# Patient Record
Sex: FEMALE | Race: BLACK OR AFRICAN AMERICAN | Employment: UNEMPLOYED | URBAN - METROPOLITAN AREA
[De-identification: names, ages, dates, MRNs, and addresses within clinical notes are randomized per-mention and may not be internally consistent; named-entity substitution may affect disease eponyms.]

---

## 2021-10-28 ENCOUNTER — HOSPITAL ENCOUNTER (EMERGENCY)
Age: 61
Discharge: LEFT BEFORE DISCHARGE | End: 2021-10-29
Attending: EMERGENCY MEDICINE

## 2021-10-28 PROCEDURE — 75810000275 HC EMERGENCY DEPT VISIT NO LEVEL OF CARE

## 2021-10-28 NOTE — ED TRIAGE NOTES
Patient presents to triage, denies SI, but wants to be evaluated for her mental health. Informed her on the screening process of labs, covid swab and urine. She states she does not want to do that. And left from triage before allowing vital signs or any other hx.

## 2021-11-02 ENCOUNTER — HOSPITAL ENCOUNTER (INPATIENT)
Age: 61
LOS: 14 days | Discharge: HOME | DRG: 752 | End: 2021-11-16
Attending: EMERGENCY MEDICINE | Admitting: PSYCHIATRY & NEUROLOGY
Payer: COMMERCIAL

## 2021-11-02 DIAGNOSIS — F23 ACUTE PSYCHOSIS (HCC): Primary | ICD-10-CM

## 2021-11-02 DIAGNOSIS — F22 PARANOIA (HCC): ICD-10-CM

## 2021-11-02 LAB
ALBUMIN SERPL-MCNC: 4.1 G/DL (ref 3.4–5)
ALBUMIN/GLOB SERPL: 1 {RATIO} (ref 0.8–1.7)
ALP SERPL-CCNC: 75 U/L (ref 45–117)
ALT SERPL-CCNC: 18 U/L (ref 13–56)
AMPHET UR QL SCN: NEGATIVE
ANION GAP SERPL CALC-SCNC: 3 MMOL/L (ref 3–18)
AST SERPL-CCNC: 14 U/L (ref 10–38)
ATRIAL RATE: 101 BPM
BARBITURATES UR QL SCN: NEGATIVE
BASOPHILS # BLD: 0.1 K/UL (ref 0–0.1)
BASOPHILS NFR BLD: 1 % (ref 0–2)
BENZODIAZ UR QL: NEGATIVE
BILIRUB SERPL-MCNC: 0.4 MG/DL (ref 0.2–1)
BUN SERPL-MCNC: 14 MG/DL (ref 7–18)
BUN/CREAT SERPL: 15 (ref 12–20)
CALCIUM SERPL-MCNC: 9.5 MG/DL (ref 8.5–10.1)
CALCULATED P AXIS, ECG09: 75 DEGREES
CALCULATED R AXIS, ECG10: 41 DEGREES
CALCULATED T AXIS, ECG11: 65 DEGREES
CANNABINOIDS UR QL SCN: NEGATIVE
CHLORIDE SERPL-SCNC: 107 MMOL/L (ref 100–111)
CK MB CFR SERPL CALC: NORMAL % (ref 0–4)
CK MB SERPL-MCNC: <1 NG/ML (ref 5–25)
CK SERPL-CCNC: 75 U/L (ref 26–192)
CO2 SERPL-SCNC: 27 MMOL/L (ref 21–32)
COCAINE UR QL SCN: NEGATIVE
COVID-19 RAPID TEST, COVR: NOT DETECTED
CREAT SERPL-MCNC: 0.94 MG/DL (ref 0.6–1.3)
DIAGNOSIS, 93000: NORMAL
DIFFERENTIAL METHOD BLD: ABNORMAL
EOSINOPHIL # BLD: 0 K/UL (ref 0–0.4)
EOSINOPHIL NFR BLD: 1 % (ref 0–5)
ERYTHROCYTE [DISTWIDTH] IN BLOOD BY AUTOMATED COUNT: 12.3 % (ref 11.6–14.5)
ETHANOL SERPL-MCNC: <3 MG/DL (ref 0–3)
GLOBULIN SER CALC-MCNC: 4.3 G/DL (ref 2–4)
GLUCOSE SERPL-MCNC: 106 MG/DL (ref 74–99)
HCT VFR BLD AUTO: 44.1 % (ref 35–45)
HDSCOM,HDSCOM: NORMAL
HGB BLD-MCNC: 14.4 G/DL (ref 12–16)
LYMPHOCYTES # BLD: 2 K/UL (ref 0.9–3.6)
LYMPHOCYTES NFR BLD: 35 % (ref 21–52)
MAGNESIUM SERPL-MCNC: 2.3 MG/DL (ref 1.6–2.6)
MCH RBC QN AUTO: 28.6 PG (ref 24–34)
MCHC RBC AUTO-ENTMCNC: 32.7 G/DL (ref 31–37)
MCV RBC AUTO: 87.7 FL (ref 78–100)
METHADONE UR QL: NEGATIVE
MONOCYTES # BLD: 0.6 K/UL (ref 0.05–1.2)
MONOCYTES NFR BLD: 11 % (ref 3–10)
NEUTS SEG # BLD: 3 K/UL (ref 1.8–8)
NEUTS SEG NFR BLD: 53 % (ref 40–73)
OPIATES UR QL: NEGATIVE
P-R INTERVAL, ECG05: 144 MS
PCP UR QL: NEGATIVE
PLATELET # BLD AUTO: 311 K/UL (ref 135–420)
PMV BLD AUTO: 10.2 FL (ref 9.2–11.8)
POTASSIUM SERPL-SCNC: 4 MMOL/L (ref 3.5–5.5)
PROT SERPL-MCNC: 8.4 G/DL (ref 6.4–8.2)
Q-T INTERVAL, ECG07: 338 MS
QRS DURATION, ECG06: 72 MS
QTC CALCULATION (BEZET), ECG08: 438 MS
RBC # BLD AUTO: 5.03 M/UL (ref 4.2–5.3)
SODIUM SERPL-SCNC: 137 MMOL/L (ref 136–145)
SOURCE, COVRS: NORMAL
TROPONIN I SERPL-MCNC: <0.02 NG/ML (ref 0–0.04)
TSH SERPL DL<=0.05 MIU/L-ACNC: 0.69 UIU/ML (ref 0.36–3.74)
VENTRICULAR RATE, ECG03: 101 BPM
WBC # BLD AUTO: 5.7 K/UL (ref 4.6–13.2)

## 2021-11-02 PROCEDURE — 82553 CREATINE MB FRACTION: CPT

## 2021-11-02 PROCEDURE — 82550 ASSAY OF CK (CPK): CPT

## 2021-11-02 PROCEDURE — 84443 ASSAY THYROID STIM HORMONE: CPT

## 2021-11-02 PROCEDURE — 87635 SARS-COV-2 COVID-19 AMP PRB: CPT

## 2021-11-02 PROCEDURE — 99283 EMERGENCY DEPT VISIT LOW MDM: CPT

## 2021-11-02 PROCEDURE — 85025 COMPLETE CBC W/AUTO DIFF WBC: CPT

## 2021-11-02 PROCEDURE — 65220000001 HC RM PRIVATE PSYCH

## 2021-11-02 PROCEDURE — 80307 DRUG TEST PRSMV CHEM ANLYZR: CPT

## 2021-11-02 PROCEDURE — 80053 COMPREHEN METABOLIC PANEL: CPT

## 2021-11-02 PROCEDURE — 82077 ASSAY SPEC XCP UR&BREATH IA: CPT

## 2021-11-02 PROCEDURE — 83735 ASSAY OF MAGNESIUM: CPT

## 2021-11-02 PROCEDURE — 93005 ELECTROCARDIOGRAM TRACING: CPT

## 2021-11-02 PROCEDURE — 84484 ASSAY OF TROPONIN QUANT: CPT

## 2021-11-02 RX ORDER — ALPRAZOLAM 0.25 MG/1
0.5 TABLET ORAL
Status: DISPENSED | OUTPATIENT
Start: 2021-11-02 | End: 2021-11-03

## 2021-11-02 NOTE — BSMART NOTE
Comprehensive Assessment Integrated Summary Patient is a 63year old female who presented to the emergency room with c/o \"I feel paranoid. I feel like somebody is out to get me. I'm afraid to be alone. I can't sleep. My two dogs , and I really love my dogs. My son has been arrested for murder. My son is a good person, and we don't believe that he murdered somebody. \" Prior to crisis evaluation, patient appeared slightly frantic when asking sister several times \"please. ..come in here with me. \" Sister assured patient that she will wait in the lobby; emotional support offered, as well. Patient stated that she has been off medications, and she did not follow up with outpatient services, after being discharged from [facility], . \" Patient verbalized that she has diagnosed with paranoia in the year 2, and she has been admitted to several behavioral health facilities in the past. Patient denied thoughts of harm towards self or others. Patient denied hallucinations. Patient also denied use of illicit substances or alcoholic beverages. Patient added that she has a \"different brain. I can sense stuff, if I get one or two clues. I can figure out everything that's going on. \" 
 
Mental Status Exam 
 
The patient's appearance shows no evidence of impairment. The patient's behavior is guarded. The patient is oriented to time, place, person and situation. The patient's speech shows no evidence of impairment. The patient's mood is depressed. The range of affect is anxious, suspsicious. The patient's thought content demonstrates paranoia. The thought process shows no evidence of impairment. The patient's perception shows no evidence of impairment. The patient's memory shows no evidence of impairment. The patient's appetite shows no evidence of impairment. The patient's sleep has evidence of insomnia, \"up at night, I only sleep about one hour/night. \"  
 
DME: [employer] Access to weapons: Denied Housing: \"I live with my brother. \" 
: [employer] Legal Issues: Denied Education: \"1 year college\" Medications: \"None\" Substance Abuse: Denied Outpatient Care: \"None, since 2012\" Inpatient Services: \"[facility], [facility], SN\" Contact/Support Person: Chaitanya Gomez, daughter, [phone]\" Disposition Patient is receptive to voluntary admission at [facility]; discussed with on-call psychiatrist, admission orders received, and report called to charge nurse.  
 
Ben Boswell, RN, BSN

## 2021-11-02 NOTE — ED PROVIDER NOTES
EMERGENCY DEPARTMENT HISTORY AND PHYSICAL EXAM      Date: 11/2/2021  Patient Name: Debbie Stewart    History of Presenting Illness     Chief Complaint   Patient presents with   [address] Problem       History Provided By: Patient    HPI: Debbie Stewart, 63 y.o. female PMHx significant for htn, anxiety presents ambulatory to the ED. Patient reports feelings of paranoia with increased thoughts of people coming after her or coming to find her. Pt reports increased difficulty sleeping. Patient reports history of anxiety and depression. Patient reports she has previously been medicated with multiple medications but she is not taking any currently. Patient reports Zoloft and celexa have worked best for her in the past.  Patient also reports increased stressors including unemployment, death of her dogs, and trouble with her son. Pt is not currently taking any medication. Denies CP, SOB, dizziness, lower leg swelling. Denies SI and HI. Multiple times during visit pt asked \"are you all going to hurt me?\". There are no other complaints, changes, or physical findings at this time. PCP: None    No current facility-administered medications on file prior to encounter. Current Outpatient Medications on File Prior to Encounter   Medication Sig Dispense Refill    multivitamin (ONE A DAY) tablet Take 1 Tab by mouth daily. Past History     Past Medical History:  Past Medical History:   Diagnosis Date    Anxiety     Hypertension        Past Surgical History:  Past Surgical History:   Procedure Laterality Date    HX BREAST BIOPSY Left 1990s    for a cyst pt says it was not cancer    HX ORTHOPAEDIC Right 1999    R elbow       Family History:  No family history on file.     Social History:  Social History     Tobacco Use    Smoking status: Never Smoker    Smokeless tobacco: Never Used   Substance Use Topics    Alcohol use: Not Currently    Drug use: Not Currently       Allergies:  No Known Allergies      Review of Systems   Review of Systems   Constitutional: Negative for chills and fever. Respiratory: Negative for shortness of breath. Cardiovascular: Negative for chest pain. Gastrointestinal: Negative for abdominal pain, nausea and vomiting. Genitourinary: Negative for flank pain. Musculoskeletal: Negative for back pain and myalgias. Skin: Negative for color change, pallor, rash and wound. Neurological: Negative for dizziness, weakness and light-headedness. Psychiatric/Behavioral: Positive for hallucinations and sleep disturbance. Negative for suicidal ideas. All other systems reviewed and are negative. Physical Exam   Physical Exam  Vitals and nursing note reviewed. Constitutional:       General: She is not in acute distress. Appearance: She is well-developed. Comments: Pt in NAD   HENT:      Head: Normocephalic and atraumatic. Eyes:      Conjunctiva/sclera: Conjunctivae normal.   Cardiovascular:      Rate and Rhythm: Regular rhythm. Tachycardia present. Heart sounds: Normal heart sounds. Comments: No lower leg swelling b/l  Pulmonary:      Effort: Pulmonary effort is normal. No respiratory distress. Breath sounds: Normal breath sounds. Comments: Lungs CTA  Not working to breathe  Abdominal:      General: Bowel sounds are normal. There is no distension. Palpations: Abdomen is soft. Musculoskeletal:         General: Normal range of motion. Skin:     General: Skin is warm. Findings: No rash. Neurological:      Mental Status: She is alert and oriented to person, place, and time. Psychiatric:         Mood and Affect: Mood is anxious. Thought Content: Thought content does not include homicidal or suicidal ideation. Diagnostic Study Results     Labs -   No results found for this or any previous visit (from the past 12 hour(s)).     Radiologic Studies -   No orders to display     CT Results  (Last 48 hours) None        CXR Results  (Last 48 hours)    None          Medical Decision Making   I am the first provider for this patient. I reviewed the vital signs, available nursing notes, past medical history, past surgical history, family history and social history. Vital Signs-Reviewed the patient's vital signs. Patient Vitals for the past 12 hrs:   Temp Pulse Resp BP SpO2   11/02/21 1425 97.8 °F (36.6 °C) (!) 134 20 (!) 178/77 100 %         EKG interpretation: (Preliminary)  Rhythm: sinus tachycardia; and regular . Rate (approx.): 101; Axis: normal; WY interval: normal; QRS interval: normal ; ST/T wave: normal; Other findings: normal.    No acute ischemic changes. Records Reviewed: Nursing Notes, Old Medical Records, Previous Radiology Studies and Previous Laboratory Studies    Provider Notes (Medical Decision Making):   DDx: Paranoia, Anxiety, Depression, Hyperthyroidism     64 yo F who presents with increasing paranoia. Hx anxiety and depression. On exam pt anxious and tachycardic. Asymptomatic. Denies palpitations, CP, SOB, dizziness. EKG shows no acute ischemic changes. All other labs essentially unremarkable. ED Course:   Initial assessment performed. The patients presenting problems have been discussed, and they are in agreement with the care plan formulated and outlined with them. I have encouraged them to ask questions as they arise throughout their visit. 1500: Spoke with crisis regarding pt's presentation. 1556: Pt medically cleared. Informed crisis. 1809: Transfer of care to Catrachita JOSEPH at shift change. Plan: Awaiting crisis evaluation. Attestations:    REJI Velasquez    Please note that this dictation was completed with Net[employer], the [employer] voice recognition software. Quite often unanticipated grammatical, syntax, homophones, and other interpretive errors are inadvertently transcribed by the computer software. Please disregard these errors.   Please excuse any errors that have escaped final proofreading. Thank you.

## 2021-11-02 NOTE — ED TRIAGE NOTES
Patient reports anxiety, depression and paranoia for months to years. Denies SI or hx of suicide attempt. Denies visual or auditory hallucinations. Reports decreased sleep because she feels like someone is out to get her.

## 2021-11-03 PROBLEM — F22 DELUSIONAL DISORDER (HCC): Status: ACTIVE | Noted: 2021-11-03

## 2021-11-03 PROCEDURE — 65220000003 HC RM SEMIPRIVATE PSYCH

## 2021-11-03 PROCEDURE — 99222 1ST HOSP IP/OBS MODERATE 55: CPT | Performed by: PSYCHIATRY & NEUROLOGY

## 2021-11-03 PROCEDURE — 74011250637 HC RX REV CODE- 250/637: Performed by: PSYCHIATRY & NEUROLOGY

## 2021-11-03 RX ORDER — TRAZODONE HYDROCHLORIDE 50 MG/1
50 TABLET ORAL
Status: DISCONTINUED | OUTPATIENT
Start: 2021-11-03 | End: 2021-11-16 | Stop reason: HOSPADM

## 2021-11-03 RX ORDER — IBUPROFEN 400 MG/1
400 TABLET ORAL
Status: DISCONTINUED | OUTPATIENT
Start: 2021-11-03 | End: 2021-11-16 | Stop reason: HOSPADM

## 2021-11-03 RX ORDER — HYDROXYZINE PAMOATE 50 MG/1
50 CAPSULE ORAL
Status: DISCONTINUED | OUTPATIENT
Start: 2021-11-03 | End: 2021-11-16 | Stop reason: HOSPADM

## 2021-11-03 RX ORDER — AMLODIPINE BESYLATE 5 MG/1
2.5 TABLET ORAL DAILY
Status: DISCONTINUED | OUTPATIENT
Start: 2021-11-03 | End: 2021-11-16 | Stop reason: HOSPADM

## 2021-11-03 RX ADMIN — TRAZODONE HYDROCHLORIDE 50 MG: 50 TABLET ORAL at 20:18

## 2021-11-03 NOTE — H&P
[address] HISTORY AND PHYSICAL    Name:  Brittany Mary  MR#:   [id number]  :  1960  ACCOUNT #:  [de-identified]  ADMIT DATE:  2021    IDENTIFYING INFORMATION:  The patient is a 65-year-old female, admitted to this facility on a voluntary basis on the above-mentioned date. BASIS FOR ADMISSION:  The patient presented herself to [facility] Room complaining of being anxious and specifically described the presence of increased paranoia. The patient described her paranoid thoughts are believing that people were coming after her or coming to find her. The intent of these people she said was to harm her. This resulted on the patient also describing increased difficulty sleeping, feeling anxious, and also apparently feeling depressed. It is to be mentioned that when the undersigned met with the patient this morning, she basically only mentioned the presence of paranoia, however did not want to discuss any potential reasons for which the paranoia started after a specific trigger. She only mentioned that something had happened to her that she did not want to talk about, this resulting on now people wanting to harm her. Regardless, after she was medically cleared, the case was presented to the physician on-call who admitted the patient to my service. PAST PSYCHIATRIC HISTORY:  During this initial session, the patient described again increased paranoia which has lasted for the last several years. She did not want to elaborate upon what was the incident that happened with \"someone\" but described that there was something that the incident had harmed her. During the evaluation, she indicated that one of the reasons she does not want to talk about it is because she feels very bad every time that she has talked and/or brought up what had happened to her.   The chart indicated when she came to the emergency room that the patient has had several losses including apparently the loss of her dogs and also having trouble with her son. The fact that she is currently unemployed is also another stressor; however, this is mentioned in the admission to the emergency room but not in the nurses admission note. However, the patient did not want to discuss them either. It is not clear as to who has prescribed the patient in the past.  The chart indicated that she has been prescribed with sertraline and citalopram with the latter being the one that she has been most helped by; however, the patient was very clear today when I met with her that she believes that \"God will be able to provide for me and I don't know that we will need any medications. \"  She did leave the opportunity of them to be able to decide tomorrow after we discussed medications this morning regarding the possibility of her being prescribed with something to help her with her anxiety and also to improve her depression. MEDICAL HISTORY:  The patient has had a history of hypertension. She described a history of anxiety which is the reason for which she is unemployed. 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  However, the patient became unable to do so and that increased her anxiety to the point in which she was basically fired from her job. A note from her doctor did not help either. The surgical history is remarkable for having a breast biopsy, apparently was a benign finding. It was a left-sided breast biopsy by the way. She has a history of right elbow surgery in 1999, the specifics are not clear. ALLERGIES:  The patient has a negative history of medications and/or food-related allergies. REVIEW OF SYSTEMS:  The positive review is only from the psychiatric system which is remarkable for the presence of paranoia, questionable hallucinations, and also sleep disturbance.   The patient denied however being suicidal or homicidal.  She is rather afraid. She says that someone else will harm her and apparently asked the undersigned, the same as different examiner since she was at the emergency room if they were to hurt her or not. Physical exam at the emergency department was performed which was that of a patient with blood pressure 178/77, pulse 134, temperature 97.8, respirations 20, 100% oxygen saturation rate on room air. The patient was described as being in no acute physical distress. Her cardiovascular examination was remarkable for the patient's history of tachycardia, however otherwise negative. Pulmonary was clear. On neurological examination, she was described as oriented to person, place, and time. Nothing else was added to that. Psychiatric examination was that of an anxious patient who denies being suicidal or homicidal; however, was found to be rather delusional as stated. Multiple labs have been performed since the patient was admitted to the emergency department including a CBC with differential that showed completely normal test results. CMP showed a sodium of 137, potassium 4.0, chloride 107, blood sugar 106, BUN 14, creatinine 0.94, estimated GFR above 60 mL/min. Liver function tests normal.  Cardiac enzymes negative. Alcohol level was below 3. Urine drug screen was negative. The patient had a COVID rapid testing which showed which showed negative results from a nasopharyngeal sample. An electrocardiogram done at the emergency room showed a sinus tachy with a ventricular rate response of 101 beats per minute, , QTc 438. ALCOHOL AND DRUG HISTORY:  The patient denies the use of alcohol, illicit drugs, abusing over-the-counter medications or prescription medications. PERSONAL AND FAMILY HISTORY:  The patient indicated that her mother is alive and around 80years of age. Father is . She believes that her mother is also in trouble because of the patient herself. She described that she has a brother and a nephew who have had psychiatric problems as they \"hear voices. \"  The patient has two children; a son and daughter with her son having one daughter and her daughter having three of her own. The patient is currently unemployed. She attempted to obtain social security disability; however, she was rejected. She last worked on or about 2018. MENTAL STATUS EXAMINATION: This is a female who looks her stated age. During this evaluation, there is no evidence of alcohol or any other type of drug-related signs of intoxication or withdrawal symptoms. She is currently coherent, showing quality of continuity of associations without evidence of flight of ideas and/or pressure of speech. She is rather suspicious and hypervigilant. She denied to the undersigned any hallucinations and also ideas of reference or influence; however, she is obviously delusional.  The patient described herself as anxious; however, she denied to me being depressed. She denies suicidal and/or homicidal thoughts, plans, and/or intentions. She is mostly afraid that she is going to be hurt. During the session, she asked me several times if I was going to hurt her. I discussed the case with our treatment team prior to my seeing her and the nurse that evaluated her this morning also indicated that the patient was complaining to her and was asking her if she was going to harm her or not. CLINICAL IMPRESSION:  AXIS I:  Delusional disorder. AXIS II:  Noncontributory. AXIS III:  Hypertension by history. History of left breast biopsy with negative results. Remote history of right elbow surgery in 1999, specifics not known. TREATMENT PLAN:  1.   The patient was admitted to the adult program.  She will be seen on daily individual psychiatric basis and will be referred to the groups within the context of the program.  2.  The patient will have assigned an inpatient  that was going to help us out obtaining further information about the patient herself. 3.  During the evaluation, the patient and the undersigned discussed the possibility of treatment with \"medications;\" however, she indicated that she would prefer not to be prescribed anything at all. She indicated that she is wanting to wait until tomorrow to decide if she wants to take medications or not. During the evaluation, the patient described that she believes in God and that is why she hopes He is going to cure her. 4.  So it is possible that if she refuses medications that the impact of her admission will be very limited. Again, I am hopeful that she will be able to agree to medications tomorrow. ESTIMATE LENGTH OF STAY/PROGNOSIS:  ELOS is five days with outpatient treatment referral then. Prognosis will entirely depend upon treatment response and treatment compliance. Alejo Odell MD, LFAPA      [id number]  D:  11/03/2021 10:15  T:  11/03/2021 16:40  JOB #:  [id number]    Behavioral Services  Medicare Certification Upon Admission    I certify that this patient's inpatient psychiatric hospital admission is medically necessary for:      [x] Treatment which could reasonably be expected to improve this patient's condition,       [] For diagnostic study;     AND     [x] The inpatient psychiatric services are provided while the individual is under the care of a physician and are included in the individualized plan of care. Estimated length of stay/service is 5 to 7 days. Plan for post-hospital care: The patient will require medical and psychiatric outpatient follow-up. The former with a PCP of her choice, the latter with a local community services Board.     Electronically signed by [unfilled] on 11/5/2021 at 2:50 PM

## 2021-11-03 NOTE — PROGRESS NOTES
Problem: Psychosis  Goal: *STG: Remains safe in hospital  Description: Pt will remain safe in hospital daily during this admission. Outcome: Progressing Towards Goal   Pt. is visible in the milieu but keeps to herself. She is anxious, paranoid but refusing to take any medications and eat the hospital food. She told this writer that she does not feel safe here, frequent reassurance and redirection done. She denies SI,HI or A/V/H. Will continue to monitor for safety and provide support as needed.

## 2021-11-03 NOTE — BH NOTES
The documentation for this period is being entered following the guidelines as defined in the [facility] downtime policy by Kyung Saucedo (CURT). Pt downtime sheets are in patient chart.

## 2021-11-03 NOTE — ROUTINE PROCESS
Pt presented  to ER with c/o anxiety, paranoia and depression. She reports that her paranoia  has  worsen and believe  that people are out to get her. She doesn't want to be around people she doesn't know. Pt tells me that she doesn't want to be here either because we're all strangers. Pt reassured  and encouraged to voice her needs as needed and staff will be available to support and assist her needs. Pt is currently living with her brother due to her being jobless, and no income. Pt denies alcohol or drug use, she denies legal issues and she is self admit. She currently sleeps 1 to 3 hrs nightly due anxiety. Her  BP is  eleveted and currently not on BP medication probably due to insurance issue, she only takes vitamins at home. Pt refused to signed admission papers. Pt  was oriented to unit and expectations related to treatment, unit tour done. Staff continues to monitor for safety and provide a supportive environment.

## 2021-11-03 NOTE — BSMART NOTE
ART THERAPY GROUP PROGRESS NOTE PATIENT SCHEDULED FOR GROUP AT: 14:15 
 
ATTENDANCE: Full PARTICIPATION LEVEL:Participates fully in the art process ATTENTION LEVEL: Able to focus on task FOCUS: Mindfulness SYMBOLIC & THEMATIC CONTENT AS NOTED IN IMAGERY: She was calm, compliant, and invested in the task at hand. She chose the quote \"everything is on the other side of fear\" and spoke about the difference between using fear as a means to cripple one's progress or using it to be aware and prepared to promote one's safety.

## 2021-11-03 NOTE — PROGRESS NOTES
Patient states she was doing \" ok\". States \" I don't trust people, or the food you give me or the medications, I'm not suicidal\". 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

## 2021-11-03 NOTE — ED NOTES
Report called to [facility] on crisis floor.    Vanna Kan, crisis supervisor requesting we wait 15 minutes to send pt up

## 2021-11-03 NOTE — BSMART NOTE
History:  Arnav Vázquez, 63 y.o. female PMHx significant for htn, anxiety presents ambulatory to the ED. Patient reports feelings of paranoia with increased thoughts of people coming after her or coming to find her. Pt reports increased difficulty sleeping. Patient reports history of anxiety and depression    Assessment/Intervention: SW made contact with patient as she engaged with peers in the milieu. Patient denies SI/HI. Patient denies AVH. Patient however, endorses paranoia. Patient reports she believes she is afraid someone is out to kill her. She reports the need for safety and stated the person may be in this hospital.  SW addressed patients safety. SW addresses staff's duties and ensured her safety. Patient then began to address her relationship with God. Patient reports the need for safety and wishes she could feel safe. Patient reports she is afraid to use prescribed medications and is unsure if she will comply. Patient reports she is interested in Outpatient Therapy. Patient is encouraged to continue with treatment goals. SW addressed self esteem, self efficacy and empowerment. SW will continue to monitor patient and will assist with discharge as needed.     Amina Tripathi, JUICE-E

## 2021-11-03 NOTE — H&P
Psychiatry History and Physical    Subjective:     Date of Evaluation:  11/3/2021    Reason for Referral:  Arnav Vázquez was referred to the examiners from  ED for paranoia. History of Presenting Problem: Arnav Vázquez is a 64 yo F with PMH Anxiety, HTN who was admitted for paranoia. She denies SI and HI. Tox screen, EtOH and Rapid COVID all negative. She denies any physical complaints today. Patient Active Problem List    Diagnosis Date Noted    Delusional disorder ([address] 11/03/2021     Past Medical History:   Diagnosis Date    Anxiety     Hypertension      Past Surgical History:   Procedure Laterality Date    HX BREAST BIOPSY Left 1990s    for a cyst pt says it was not cancer    HX ORTHOPAEDIC Right 1999    R elbow       No family history on file. Social History     Tobacco Use    Smoking status: Never Smoker    Smokeless tobacco: Never Used   Substance Use Topics    Alcohol use: Not Currently     Prior to Admission medications    Medication Sig Start Date End Date Taking? Authorizing Provider   multivitamin (ONE A DAY) tablet Take 1 Tab by mouth daily.    Yes Other, MD Ange     No Known Allergies     Review of Systems - History obtained from chart review and the patient  General ROS: negative  Psychological ROS: positive for - hallucinations  Ophthalmic ROS: negative  ENT ROS: negative  Respiratory ROS: negative  Cardiovascular ROS: negative  Gastrointestinal ROS: negative  Musculoskeletal ROS: negative  Neurological ROS: negative  Dermatological ROS: negative      Objective:     Patient Vitals for the past 8 hrs:   BP Temp Pulse Resp SpO2   11/03/21 0827 118/69 (!) 96.4 °F (35.8 °C) (!) 101 16 100 %       Mental Status exam: WNL except for    Sensorium  oriented to time, place and person   Orientation situation   Relations cooperative   Eye Contact appropriate   Appearance:  age appropriate   Motor Behavior:  within normal limits   Speech:  normal pitch and normal volume Vocabulary average   Thought Process: goal directed   Thought Content delusions   Suicidal ideations no plan  and no intention   Homicidal ideations no plan  and no intention   Mood:  stable   Affect:  stable   Memory recent  adequate   Memory remote:  adequate   Concentration:  adequate   Abstraction:  concrete   Insight:  fair   Reliability fair   Judgment:  fair         Physical Exam:   Visit Vitals  /69   Pulse (!) 101   Temp (!) 96.4 °F (35.8 °C)   Resp 16   SpO2 100%   Breastfeeding No     General appearance: alert, cooperative, no distress, appears stated age  Head: Normocephalic, without obvious abnormality, atraumatic  Eyes: negative  Ears: normal TM's and external ear canals AU  Nose: Nares normal. Septum midline. Mucosa normal. No drainage or sinus tenderness. Throat: Lips, mucosa, and tongue normal. Teeth and gums normal  Neck: supple, symmetrical, trachea midline and no adenopathy  Lungs: clear to auscultation bilaterally  Heart: regular rate and rhythm, S1, S2 normal, no murmur, click, rub or gallop  Abdomen: soft, non-tender. Extremities: extremities normal, atraumatic, no cyanosis or edema  Skin: Skin color, texture, turgor normal. No rashes or lesions  Neurologic: Grossly normal        Impression:      Principal Problem:    Delusional disorder ([address] (11/3/2021)          Plan:     Recommendations for Treatment/Conditions:  Psychiatric treatment recommended while in hospital  Admit to inpatient psych for paranoia     Referral To:    Inpatient psychiatric care        [place], [place]   11/3/2021 3:29 PM

## 2021-11-04 PROCEDURE — 65220000003 HC RM SEMIPRIVATE PSYCH

## 2021-11-04 PROCEDURE — 99231 SBSQ HOSP IP/OBS SF/LOW 25: CPT | Performed by: PSYCHIATRY & NEUROLOGY

## 2021-11-04 NOTE — BSMART NOTE
BH Biopsychosocial Assessment    Current Level of Psychosocial Functioning     [x]Independent  []Dependent  []Minimal Assist      Comments:      Psychosocial High Risk Factors (check all that apply)      [x]Unable to obtain meds                                                               [x]Medical / illness/pain   High BP  []Substance abuse   []Lack of Family Support   []Financial stress   [x]Isolation   []Inadequate Community Resources  []Suicide attempt(s)  [x]Not taking medications   []Victim of crime   []Developmental Delay  []Unable to manage personal needs    []Age 72 or older   []  Homeless  []Garret transportation   []Readmission within 30 days  [x]Unemployment  []Traumatic Event    Psychiatric Advanced Directive:  n/a    Family to involve in treatment: lives with brother, contact Daughter Bertis Goodpasture # #[phone]    Sexual Orientation:  Not discussed    Patient Strengths: asking for help, supportive family    Patient Barriers: hx of non compliance with meds & outpt tx , paranoid,  dysphoric, anxious, insomnia, unemployed    CD education provided: in groups & IT, denies CD hx    Safety plan: will contract for safety with nursing staff & tx team    CMHC/MH history: couple of in hospitals (last ), then never followed up with outpt tx ( last  )    Plan of Care:  medication management, group/individual therapies, family meetings, psycho -education, treatment team meetings to assist with stabilization    Initial Discharge Plan:  [place] [facility]    Clinical Summary:     Pt is a 63year old female who presented to the emergency room with c/o \"I feel paranoid. I feel like somebody is out to get me. I'm afraid to be alone. I can't sleep. My two dogs , and I really love my dogs. My son has been arrested for murder. My son is a good person, and we don't believe that he murdered somebody. \" pt remains guarded. No meds since  ??   No outpt tx also since then. Lives with son. Dghter listed above also supportive. She's unemployed since 2018,  after being fired from [employer]. CLINICAL IMPRESSION:  AXIS I:  Delusional disorder. AXIS II:  Noncontributory. AXIS III:  Hypertension by history. History of left breast biopsy with negative results. Remote history of right elbow surgery in 1999, specifics not known     Intervention: although pt suspicious of writer,did explain my role as well as how [facility] adult unit structured. although release signed to speak to her daughter, pt was resistant to do so at this point.  & tx team updated.

## 2021-11-04 NOTE — PROGRESS NOTES
Problem: Psychosis  Goal: *STG: Decreased delusional thinking  Description: Pt will experience a decrease in delusional daily during this admission. Outcome: Progressing Towards Goal  Goal: *STG: Remains safe in hospital  Description: Pt will remain safe in hospital daily during this admission. Outcome: Progressing Towards Goal  Goal: *STG: Participates in individual and group therapy  Description: Pt will participates in individual and group therapy daily while hospitalized. Outcome: Progressing Towards Goal  Goal: *STG/LTG: Complies with medication therapy  Description: Pt will be medication compliant daily. Outcome: Progressing Towards Goal   Patient is paranoid, guarded. Refused to have vital signs taken. Did not take her morning medications. She usually sits alone. Will attend select groups.

## 2021-11-04 NOTE — GROUP NOTE
[facility] GROUP DOCUMENTATION INDIVIDUAL Group Therapy Note Date: 11/3/2021 Group Start Time: 2000 Group End Time: 2030 Group Topic: Medication [facility] 1 ADULT CHEM DEP Cheyenne Medrano RN 
 
[facility] GROUP DOCUMENTATION GROUP Group Therapy Note Attendees: 6 Attendance: Did not attend Additional Notes:  Pt. decline to join the group.  
 
Dae Schwarz RN

## 2021-11-04 NOTE — BH NOTES
SIMONA Note:-S- \"These people is poisoning me\"-\"I don't trust them I am not eating this open food\". \"-- The above pt appeared to be very paranoid and has refused to eat her lunch. She was educated by the staff (person writing) the importance of safety while on the unit. She appeared to be receptive of this information. She verbalized \"I only eat seafood and vegetables that how I lost all this weight\". The above pt was encouraged to consume some of her food she only drunk her milk and refused to eat her meal. She was informed on the sealed food items that her family can bring (per policy) she then proceeded to verbalize \"I want my daughter to bring me food up to the door and hand it to me that way I know it is safe\". She again was educated on the visiting  Policy and also the a nutrients will be able to come and see her in regards to her food needs. She appeared receptive of this information. Staff informed the nurse and also called dietary to get the above pt some sealed fruit cups during this shift.

## 2021-11-04 NOTE — PROGRESS NOTES
Behavioral Health Progress Note    Admit Date: 11/2/2021  Hospital day 2    Vitals : Patient Vitals for the past 8 hrs:   Height Weight   11/04/21 1452 5' 5\" (1.651 m) 53.5 kg (118 lb)     Labs:  No results found for this or any previous visit (from the past 24 hour(s)). Meds:   Current Facility-Administered Medications   Medication Dose Route Frequency    hydrOXYzine pamoate (VISTARIL) capsule 50 mg  50 mg Oral TID PRN    traZODone (DESYREL) tablet 50 mg  50 mg Oral QHS PRN    ibuprofen (MOTRIN) tablet 400 mg  400 mg Oral Q6H PRN    amLODIPine (NORVASC) tablet 2.5 mg  2.5 mg Oral DAILY      Hospital Problems: Principal Problem:    Delusional disorder ([address] (11/3/2021)        Subjective:   Medication side effects: NA  NA    Mental Status Exam  Sensorium: alert  Orientation: only aware of  time, place and person  Relations: guarded and passive  Eye Contact: poor  Appearance: is bizarre  Thought Process: normal rate of thoughts and poor abstract reasoning/computation   Thought Content: delusions and paranoia   Suicidal: denies, level1   Homicidal: none   Mood: is anxious   Affect: stable  Memory: shows no evidence of impairment     Concentration: distractable  Abstraction: concrete  Insight: The patient shows little insight    OR Poor  Judgement: is psychologically impaired OR  Poor    Assessment/Plan:   not changed    Continue close observation      Patient is seen today in coverage for Dr. Gerry Parmar who is ill. She has not been placed on any medications. she did receive trazodone last night and says that caused her left thigh to feel numb so that is proof that she should not be using medications. She continues to insist that she does not need any.   She is quite paranoid and concerned that there is someone out there that is her enemy who is paying someone else to poison her food so that she could not eat food here in the hospital.  The dietitian did work with her to try to get her some foods that are sealed and she did drink Ensure. She continues to say she will not eat any foods that are not sealed. She says she is just going to wait to see whether or not she needs treatment of any kind. She denies homicidal or suicidal ideas. She does refuse vital signs and refuses to take blood pressure medicines also. It is impossible to know if she needs blood pressure medicine since she will not allow her vital signs to be taken.

## 2021-11-04 NOTE — CONSULTS
Comprehensive Nutrition Assessment    Type and Reason for Visit: Initial, Consult    Nutrition Recommendations/Plan:   - Modify diet to provide pre-packaged foods, lacto-ovo pescatarian.  - Add supplements: Ensure Enlive, TID.  - Monitor and encourage po intake as tolerated. - Updated pt's wt today. Nutrition Assessment:  Pt paranoid, refusing to take medications or eat hospital food. States she would feel better with pre-packaged foods and ate a fruit cup that was sealed today. Pt agreeable to Ensure supplements and standing scale wt obtained today. Discussed with kitchen staff. Malnutrition Assessment:  Malnutrition Status: At risk for malnutrition (specify) (paranoia, only eating pre-packaged meals)      Nutrition History and Allergies: PMHx- HTN and anxiety. Presented to ED c/o being anxious and specifically describes the presence of increased paranoia. Pt unfamiliar with weight trends- 121# in November 2019 per chart and standing scale wt of 118# (11/4/22). Variable appetite and po intake. States she has been a vegetarian, eats fish, eggs and dairy. NKFA. Estimated Daily Nutrient Needs:  Energy (kcal): 8050-6046; Weight Used for Energy Requirements: Current (54 kg)  Protein (g): 43-65; Weight Used for Protein Requirements: Current (0.8-1.2)  Fluid (ml/day): 1681-1275; Method Used for Fluid Requirements: 1 ml/kcal      Nutrition Related Findings:  None      Wounds:    None       Current Nutrition Therapies:  ADULT DIET Regular    Anthropometric Measures:  · Height:  5' 5\" (165.1 cm)  · Current Body Wt:  53.5 kg (118 lb)   · Admission Body Wt:  118 lb    · Usual Body Wt:  54.9 kg (121 lb) (November 2019 per chart)     · Ideal Body Wt:  125 lbs:  94.4 %     · BMI Category:  Normal weight (BMI 18.5-24. 9)       Nutrition Diagnosis:   · Inadequate oral intake related to psychological cause or life stress (paranoia) as evidenced by intake 0-25%    Nutrition Interventions:   Food and/or Nutrient Delivery: Modify current diet, Start oral nutrition supplement  Nutrition Education and Counseling: No recommendations at this time, Education not indicated  Coordination of Nutrition Care: Continue to monitor while inpatient (pt discussed with nursing)    Goals:  PO nutrition intake will meet >75% of patient estimated nutritional needs within the next 7 days.        Nutrition Monitoring and Evaluation:   Behavioral-Environmental Outcomes: Beliefs and attitudes  Food/Nutrient Intake Outcomes: Diet advancement/tolerance, Food and nutrient intake, Supplement intake  Physical Signs/Symptoms Outcomes: Meal time behavior, Nutrition focused physical findings    Discharge Planning:    Continue current diet, Continue oral nutrition supplement     Electronically signed by Amaris Cool RD on 11/4/2021 at 3:05 PM    Contact: [phone]

## 2021-11-04 NOTE — BH NOTES
SBAR report received from [place]. Behavioral health rounds completed. Pt does not appear to be in distress. Pt encouraged to seek staff for questions and concerns.

## 2021-11-04 NOTE — BSMART NOTE
SOCIAL WORK GROUP THERAPY PROGRESS NOTE Group Time:  10:15am 
 
Group Topic:  Coping Skills Group Participation: Pt was unavailable for group as she continues to be suspicious and meeting with nursing staff about her concerns. INFECTIOUS DISEASES FOLLOW UP--Darnell Davis MD  Pager [phone]    This is a follow up note for this  54y Female with  abd inflammation/infection--likely with mesh involvement.  plans noted for surgical exploration tomorrow.  feels better compared to admission---less pain.    Further ROS:  CONSTITUTIONAL:  No fever, good appetite  CARDIOVASCULAR:  No chest pain or palpitations  RESPIRATORY:  No dyspnea  GASTROINTESTINAL:  No nausea, vomiting, diarrhea, or abdominal pain  GENITOURINARY:  No dysuria  NEUROLOGIC:  No headache,     Allergies  No Known Allergies    ANTIBIOTICS/RELEVANT:  antimicrobials  piperacillin/tazobactam IVPB. 3.375 Gram(s) IV Intermittent every 8 hours    OTHER:  acetaminophen   Tablet. 650 milliGRAM(s) Oral every 6 hours PRN  acetaminophen  IVPB. 1000 milliGRAM(s) IV Intermittent once  acetaminophen  IVPB. 1000 milliGRAM(s) IV Intermittent once  aspirin  chewable 81 milliGRAM(s) Oral daily  atorvastatin 80 milliGRAM(s) Oral at bedtime  carvedilol 6.25 milliGRAM(s) Oral every 12 hours  dextrose 5%. 1000 milliLiter(s) IV Continuous <Continuous>  dextrose 50% Injectable 12.5 Gram(s) IV Push once  dextrose 50% Injectable 25 Gram(s) IV Push once  dextrose 50% Injectable 25 Gram(s) IV Push once  dextrose Gel 1 Dose(s) Oral once PRN  diphenhydrAMINE   Capsule 50 milliGRAM(s) Oral every 6 hours PRN  docusate sodium 100 milliGRAM(s) Oral three times a day  glucagon  Injectable 1 milliGRAM(s) IntraMuscular once PRN  heparin  Injectable 5000 Unit(s) SubCutaneous every 8 hours  insulin glargine Injectable (LANTUS) 10 Unit(s) SubCutaneous at bedtime  insulin lispro (HumaLOG) corrective regimen sliding scale   SubCutaneous three times a day before meals  insulin lispro (HumaLOG) corrective regimen sliding scale   SubCutaneous at bedtime  insulin lispro Injectable (HumaLOG) 4 Unit(s) SubCutaneous three times a day before meals  lactated ringers. 1000 milliLiter(s) IV Continuous <Continuous>  lisinopril 10 milliGRAM(s) Oral daily  oxyCODONE    IR 5 milliGRAM(s) Oral every 4 hours PRN  oxyCODONE    IR 10 milliGRAM(s) Oral every 4 hours PRN  senna 2 Tablet(s) Oral at bedtime  sodium chloride 0.9% lock flush 3 milliLiter(s) IV Push every 8 hours    Objective:  Vital Signs Last 24 Hrs  T(C): 36.5 (02 Nov 2017 09:12), Max: 37 (02 Nov 2017 05:22)  T(F): 97.7 (02 Nov 2017 09:12), Max: 98.6 (02 Nov 2017 05:22)  HR: 69 (02 Nov 2017 09:12) (69 - 89)  BP: 134/78 (02 Nov 2017 09:12) (127/57 - 158/73)  BP(mean): --  RR: 17 (02 Nov 2017 09:12) (16 - 18)  SpO2: 97% (02 Nov 2017 09:12) (96% - 99%)    PHYSICAL EXAM:  Constitutional:no acute distress  Eyes:KENIA, EOMI  Ear/Nose/Throat: no oral lesions, 	  Respiratory: clear BL  Cardiovascular: S1S2  Gastrointestinal:soft, (+) BS, no tenderness  Extremities:no e/e/c  No Lymphadenopathy  IV sites not inflammed.    LABS:                        9.8    9.6   )-----------( 169      ( 02 Nov 2017 07:19 )             30.2     11-02    134<L>  |  96  |  19  ----------------------------<  303<H>  4.3   |  24  |  1.22    Ca    9.2      02 Nov 2017 07:18  Phos  3.1     11-02  Mg     1.6     11-02    TPro  7.2  /  Alb  3.8  /  TBili  0.4  /  DBili  0.1  /  AST  18  /  ALT  25  /  AlkPhos  79  11-02    MICROBIOLOGY: NA    Imp/Rx:  Awaiting OR.  Stable from ID standpoint.  to continue zosyn  duration and route of abx to be determined after OR findings discovered

## 2021-11-04 NOTE — BH NOTES
Pt. is withdrawn, isolative and paranoid. She stays in her room most of the time and refused to eat dinner. She denies SI,HI or A/V/H. Will continue to monitor for safety and provide support as.needed.

## 2021-11-05 PROCEDURE — 99232 SBSQ HOSP IP/OBS MODERATE 35: CPT | Performed by: PSYCHIATRY & NEUROLOGY

## 2021-11-05 PROCEDURE — 74011250637 HC RX REV CODE- 250/637: Performed by: PSYCHIATRY & NEUROLOGY

## 2021-11-05 PROCEDURE — 65220000003 HC RM SEMIPRIVATE PSYCH

## 2021-11-05 RX ORDER — LORAZEPAM 2 MG/ML
1 INJECTION INTRAMUSCULAR
Status: DISCONTINUED | OUTPATIENT
Start: 2021-11-05 | End: 2021-11-08

## 2021-11-05 RX ORDER — HALOPERIDOL 5 MG/ML
2 INJECTION INTRAMUSCULAR
Status: DISCONTINUED | OUTPATIENT
Start: 2021-11-05 | End: 2021-11-16 | Stop reason: HOSPADM

## 2021-11-05 RX ORDER — BENZTROPINE MESYLATE 1 MG/ML
0.5 INJECTION INTRAMUSCULAR; INTRAVENOUS
Status: DISCONTINUED | OUTPATIENT
Start: 2021-11-05 | End: 2021-11-16 | Stop reason: HOSPADM

## 2021-11-05 RX ORDER — LORAZEPAM 1 MG/1
1 TABLET ORAL
Status: DISCONTINUED | OUTPATIENT
Start: 2021-11-05 | End: 2021-11-12

## 2021-11-05 RX ORDER — BENZTROPINE MESYLATE 1 MG/1
0.5 TABLET ORAL
Status: DISCONTINUED | OUTPATIENT
Start: 2021-11-05 | End: 2021-11-16 | Stop reason: HOSPADM

## 2021-11-05 RX ORDER — HALOPERIDOL 2 MG/1
2 TABLET ORAL
Status: DISCONTINUED | OUTPATIENT
Start: 2021-11-05 | End: 2021-11-12

## 2021-11-05 RX ADMIN — AMLODIPINE BESYLATE 2.5 MG: 5 TABLET ORAL at 09:00

## 2021-11-05 NOTE — BH NOTES
Patient came to RN desk requesting to be discharged. Patient educated on appropriate discharge process and informed doctor will need to be called. Patient then stated her daughter \"is coming up here and I'm supposed to go meet her. \"  Patient was informed of \"no visitation due to [place]. \"  This nurse was informed by  that patient went over to patient phone and called 911. Patient continues to demonstrate paranoid thoughts/behaviors and voiced \"I don't feel safe here. \"  Patient not responsive to staff reassurance. Doctor requests [facility] evaluation. This nurse informed supervisor. Patient informed of next step and waiting process for evaluation.

## 2021-11-05 NOTE — BH NOTES
Patient refused to have this RN retake blood pressure this morning stating \"it just makes me so anxious. \"  Patient also refused morning dose of NORVASC. Dr portillo.

## 2021-11-05 NOTE — PROGRESS NOTES
[address]  Inpatient Progress Note     Date of Service: 11/05/21  Hospital Day: 3     Subjective/Interval History   11/05/21    Treatment Team Notes:  Notes reviewed and/or discussed and report that Julieanne Frankel is a patient with a recent diagnosis of delusional disorder, still refusing to take medications. Patient interview: Julieanne Frankel was interviewed by this writer today. During the session today, we discussed in detail the need for her to be able to be prescribed not only medically but also psychiatrically. The patient's blood pressure is elevated, however she is refusing to take the prescription of Norvasc as indicated. We also discussed the possibility of prescribing a low-dose of olanzapine. We discussed side effe cts and adverse effects associated to prescription  with medications, but in addition the effects of not being able to take something that will be able to help her with her strong paranoid ideation. The patient has lost weight, and so in my opinion an atypical antipsychotic should be prescribed. The reason for the above mentioned prescription for olanzapine. A low dose of 2.5 mg at bedtime will be prescribed and hopefully the patient will take it. After the patient was seen today, staff informed me that she had called [phone] telling the police that she is being captured, and demanding discharge from the hospital.  Since the patient psychosis is very severe, we are requesting an evaluation by the [facility]. If the patient is considered not to be attainable, she should be discharged [address] Objective     Visit Vitals  BP (!) 170/75 (BP 1 Location: Right upper arm, BP Patient Position: Sitting)   Pulse (!) 123   Temp 97 °F (36.1 °C)   Resp 19   Ht 5' 5\" (1.651 m)   Wt 53.5 kg (118 lb)   SpO2 100%   Breastfeeding No   BMI 19.64 kg/m²     Elevated blood pressure as indicated.     No results found for this or any previous visit (from the past 24 hour(s)). Mental Status Examination     Appearance/Hygiene 63 y.o. BLACK/ female  Hygiene: Fair   Behavior/Social Relatedness  withdrawn, suspicious of others, agitated at times. Musculoskeletal Gait/Station: appropriate  Tone (flaccid, cogwheeling, spastic): not assessed  Psychomotor (hyperkinetic, hypokinetic): Agitated at times. Involuntary movements (tics, dyskinesias, akathisa, stereotypies): none   Speech   Rate, rhythm, volume, fluency and articulation are appropriate   Mood   denies depression, no evidence of hypomania or abbey noted. Affect    labile, irritable   Thought Process Linear and goal directed   Thought Content and Perceptual Disturbances Denies self-injurious behavior (SIB), suicidal ideation (SI), aggressive behavior or homicidal ideation (HI), however she is very delusional, questions being raised about her ability to take care of self appropriately. Hypervigilant and suspicious. Sensorium and Cognition  Grossly intact   Insight  poor   Judgment  poor        Assessment/Plan      Psychiatric Diagnoses:   Patient Active Problem List   Diagnosis Code    Delusional disorder ([address] F22       Medical Diagnoses: Hypertension    Psychosocial and contextual factors: Same    Level of impairment/disability: Severe    1. Upon the patient's request for discharge, the [facility] will be requested to evaluate for possible detainment. If the patient is considered not to be able to be detained, she will be discharged [address] Her prognosis will be rather poor then. 2.  Reviewed instructions, risks, benefits and side effects of medications  3. Disposition/Discharge Date: self-care/home, to be determined due to above request for discharge.     Ermelinda Sorto MD, [address] [place] [address]  Psychiatry

## 2021-11-05 NOTE — BH NOTES
Pt's paranoia continues; pt asked staff if they put something in her toothpaste while she was in the shower. Pt was informed that nothing was put in her toothpaste.   Will continue to support

## 2021-11-05 NOTE — BH NOTES
While obtaining patient VS, cuff was unable to read for blood pressure (x3.) Patient becoming paranoid because the cuff/machine was unable to read for blood pressure. Patient informed that her blood pressure may have to be obtained manually. Patient stating \"Why can't I get it done this way?! Everyone else got it done this way. I want to be like everybody else. \" Staff able to obtain patient's blood pressure, which resulted in a high pulse. Patient sitting calmly in day area at this time.

## 2021-11-05 NOTE — BSMART NOTE
SW Contact:       Pt is a 66 year old female who presented to the emergency room with c/o \"I feel paranoid. I feel like somebody is out to get me. I'm afraid to be alone. I can't sleep. My two dogs , and I really love my dogs. My son has been arrested for murder. My son is a good person, and we don't believe that he murdered somebody. \" pt remains guarded. No meds since  ?? No outpt tx also since then. Lives with son. Dghter listed above also supportive. She's unemployed since 2018,  after being fired from [employer].     CLINICAL IMPRESSION:  AXIS I:  Delusional disorder. AXIS II:  Noncontributory. AXIS III:  Hypertension by history.  History of left breast biopsy with negative results.  Remote history of right elbow surgery in , specifics not known     Intervention: pt remained irritable, suspicious, demanding & minimizing her need for any treatment. Told writer she had called daughter to come pick her up & refused for her & writer to call daughter together. Also pt vented frustrations about medications ordered even after Dr & nursing staff explained both physical & mental health reasons for them being ordered. Writer explained d/c process & possibility of [facility]B involvement to assess her for safety. Pt had no questions, just continued to demand wanting to leave, despite support offered. Visitation policy also clarified. Pt walked out of session with writer & walked around unit. A few minutes later she apparently called 911 explaining \"not feeling safe & no need for hospitalization\". Both she &  felt there was no reason for SW to speak with them. Writer overheard  tell pt she has 'the right to leave, since she signed in voluntarily\" without explaining how that process works & again not wanting to speak with writer. After getting off phone went back to nurses station requesting Dr be called, which was done by charge nurse.      Dr & tx team updated.

## 2021-11-05 NOTE — GROUP NOTE
TANIKA  GROUP DOCUMENTATION INDIVIDUAL Group Therapy Note Date: 11/4/2021 Group Start Time: 2000 Group End Time: 2030 Group Topic: Medication [facility] 1 ADULT CHEM DEP Sarah SAGASTUME  GROUP DOCUMENTATION GROUP Group Therapy Note Attendees: 11 Attendance: Attended Interventions/techniques: Informed Follows Directions: Followed directions Interactions: Interacted appropriately Mental Status: Calm Behavior/appearance: Attentive Goals Achieved: Able to listen to others Additional Notes:  Medication compliant Reynaldo Harmon

## 2021-11-05 NOTE — BH NOTES
Patient informed CSB will be seeing patient \"late tonight. It might possibly be during the next shift. \"  Patient voiced understanding and continues to request to be discharged. Will continue to monitor and provide interventions as appropriate.

## 2021-11-05 NOTE — PROGRESS NOTES
Problem: Psychosis  Goal: *STG: Remains safe in hospital  Description: Pt will remain safe in hospital daily during this admission. Outcome: Progressing Towards Goal   Pt. is delusional and fixated of going home because something bad is going to happen. She is very anxious, intrusive and paranoid. She refused to take any PRN medications in spite of giving her education and explaination. She refused to eat meals but drank her ensure. She denies SI , HI or A/V/H. Will continue to monitor for safety and provide support as needed.

## 2021-11-05 NOTE — BSMART NOTE
Crisis Note: Several calls placed to [facility] re: request for patient to be evaluated for possible TDO; finally, this writer reached Irving, on-call clinician, via work cell phone, [phone]. Irving informed this writer that there are two ECOs in the ED that she has to interview, before she can evaluate patient; unit nurse and patient made aware.

## 2021-11-05 NOTE — BH NOTES
Pt's daughter (Ksenia Myles [phone]) called asking about her mother. She stated that she would like to speak with SW or psychiatrist.  Pt did sign a KOBI for her daughter. Daughters main concern was whether her mother was eating and whether she is improving.   Daughter was updated on mothers progress

## 2021-11-06 PROCEDURE — 65220000003 HC RM SEMIPRIVATE PSYCH

## 2021-11-06 PROCEDURE — 99232 SBSQ HOSP IP/OBS MODERATE 35: CPT | Performed by: PSYCHIATRY & NEUROLOGY

## 2021-11-06 RX ORDER — OLANZAPINE 2.5 MG/1
2.5 TABLET ORAL
Status: DISCONTINUED | OUTPATIENT
Start: 2021-11-06 | End: 2021-11-12

## 2021-11-06 NOTE — BH NOTES
Pt continues to be paranoid and bizarre. Offers no c/o si/hi avh. Asked staff if she could use the staff phone to call her family because she \"doesn't feel safe\". Pt was informed that she is safe while in the hospital and that the pt phones are in the hallway. Pt was also requesting to use the phone late at night because she doesn't feel safe at nignt. Pt was informed to have a discussion w/ staff regarding her feeling \"unsafe\" when these feelings arise, and at that time, staff will determine appropriate interventions. Pt was minimally receptive. Pt did eat her dinner meal. Pt refused her evening dose of Zyprexa 2.5 mg po.   Will continue to support patient

## 2021-11-06 NOTE — PROGRESS NOTES
[address]  Inpatient Progress Note     Date of Service: 11/06/21  Hospital Day: 4     Subjective/Interval History   11/06/21    Treatment Team Notes:  Notes reviewed and/or discussed and report that Mariam Mayberry is a patient recently admitted to the facility, with a history of strong delusional ideations and paranoia, refusing to take medications and demanding to be discharged yesterday. Upon evaluation by the crisis staff with [facility] behavioral health, the patient was considered to be unable to be discharged with questions being raised about her ability to take care of self, with the possibility of endangering herself as a result, with a TDO being obtained from a local . Patient interview: Mariam Mayberry was interviewed by this writer today. During session today, the patient and I discussed in detail the reason for the above-mentioned temporary detaining order. Her level of insight is extremely poor and her judgment is very poor. She continues to refuse medications having her been advised again about the current prescription for olanzapine 2.5 mg at bedtime which will be offered to the patient tonight. As of now she has the right to refuse the medication, however were very concerned about how severe the patient's psychotic symptoms are, and the fact that she does not allow us to treat her. We have also discussed the possibility about our obtaining a CT scan of the brain or an MRI of the brain due to concerns about the patient's psychotic symptoms being present associated later stage in her life. However she has refused any studies to be performed. Since admission she only allows an electrocardiogram to be performed, showing normal results. However no older studies have been allowed by the patient to be drawn.       Objective     Visit Vitals  /69 (BP 1 Location: Left upper arm, BP Patient Position: At rest)   Pulse 100   Temp 97 °F (36.1 °C)   Resp 20   Ht 5' 5\" (1.651 m)   Wt 53.5 kg (118 lb)   SpO2 100%   Breastfeeding No   BMI 19.64 kg/m²     The patient's blood pressure has improved. Above blood pressure is normal however she remains tachycardic. No results found for this or any previous visit (from the past 24 hour(s)). Mental Status Examination     Appearance/Hygiene 63 y.o. BLACK/ female  Hygiene: Limited   Behavior/Social Relatedness  withdrawn, easily agitated   Musculoskeletal Gait/Station: appropriate  Tone (flaccid, cogwheeling, spastic): not assessed  Psychomotor (hyperkinetic, hypokinetic): calm   Involuntary movements (tics, dyskinesias, akathisa, stereotypies): none   Speech   Rate, rhythm, volume, fluency and articulation are appropriate   Mood   labile   Affect    irritable   Thought Process Linear and goal directed   Thought Content and Perceptual Disturbances  the patient denies wanting to harm self and others, however in our opinion she is not able to appropriately take care of self. She continues to show the presence of his strong paranoid and delusional ideations, has remained very suspicious and hypervigilant   Sensorium and Cognition  Grossly intact   Insight  poor   Judgment  poor        Assessment/Plan      Psychiatric Diagnoses:   Patient Active Problem List   Diagnosis Code    [facility] F[facility]       Medical Diagnoses: Same plus hypertension    Psychosocial and contextual factors: Same    Level of impairment/disability: Severe at present    1. The patient continues to refuse medications. Upon demanding discharge a TDO was obtained with the patient being advised about the repercussions of the above-mentioned TDO, and her having to be presented to the local courts on Monday. If the patient is committed for involuntary inpatient treatment, we will have to request to the courts the patient to be able to be administered involuntarily with medications.   2.  Reviewed instructions, risks, benefits and side effects of medications  3.   Disposition/Discharge Date: self-care/home, TBD    Rosey Pollock MD, [address] [place] [address]
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
## 2021-11-06 NOTE — BH NOTES
Patient has slept on a mattress on the floor in day area because she did not feel safe alone in her room. Refused to have vital signs done on this shift.

## 2021-11-06 NOTE — GROUP NOTE
TANIKA  GROUP DOCUMENTATION INDIVIDUAL Group Therapy Note Date: 11/5/2021 Group Start Time: 2000 Group End Time: 2030 Group Topic: Medication [facility] 1 ADULT CHEM DEP Sujit Chase RN 
 
[facility] GROUP DOCUMENTATION GROUP Group Therapy Note Attendees: 9 Attendance: Did not attend Patient's Goal:  Understanding medication being offered or dispensed as prescribed. Additional Notes:  Pt has been quiet in day area. Needy at times. Requesting to go home, CSB aware and will assess when available. Encouraged multiple times to attend group and refused. Will continue to monitor and encourage group/social interaction.   
 
Ross Garner RN

## 2021-11-06 NOTE — PROGRESS NOTES
Problem: Psychosis  Goal: *STG: Remains safe in hospital  Description: Pt will remain safe in hospital daily during this admission. Outcome: Progressing Towards Goal as evidence by being free from harmful behaviors toward self/others during this shift. Problem: Psychosis  Goal: *STG/LTG: Complies with medication therapy  Description: Pt will be medication compliant daily. Outcome: Not Progressing Towards Goal as evidence by refusing scheduled NORVASC during morning medication administration. Patient continues to voice fear of \"being here\" and has been reminded by multiple staff \"safety is our number one priority. \"  patient has been observant of staff and peers this shift and has been in day room most of shift. Patient has eaten all meals and has been compliant with unit guidelines, free from falls and harm. Patient refused morning medication as noted above as well as PRN offered for anxiety. Patient not as verbally argumentative as yesterday shift with this nurse. Will continue to monitor and provide interventions as appropriate as and needed.

## 2021-11-06 NOTE — BSMART NOTE
Crisis Note: Jere Rosas, [phone], has evaluated the patient and supports a TDO on patient; charge nurse, [facility] [place] [address] Unit, made aware.

## 2021-11-06 NOTE — BH NOTES
Patient refused to have vital signs assessed despite multiple staff encouragement. Patient also refused morning dose of NORVASC.

## 2021-11-07 PROCEDURE — 99231 SBSQ HOSP IP/OBS SF/LOW 25: CPT | Performed by: PSYCHIATRY & NEUROLOGY

## 2021-11-07 PROCEDURE — 65220000003 HC RM SEMIPRIVATE PSYCH

## 2021-11-07 NOTE — PROGRESS NOTES
[address]  Inpatient Progress Note     Date of Service: 11/07/21  Hospital Day: 5     Subjective/Interval History   11/07/21    Treatment Team Notes:  Notes reviewed and/or discussed with nursing staff. The patient remains per the description not only rather suspicious but also very agitated. She continues to refuse medications as prescribed. Patient interview: Ana Landin was interviewed by this writer today. During psych rounds today, we again brought up the fact that she is refusing to take medications. She described having side effects in the past, however she is unwilling to describe which medications were the ones that she responded adversely to, knowing the reasons for which she had been prescribed with them. The patient's insight and judgment are rather poor, and even though she is denying thoughts of harming self or others, in our opinion her ability to take care of self is rather impaired. She will be presented before the local courts tomorrow. The proceedings associated to temporal detaining orders, and the require court hearing shortly thereafter, have been clearly explained to the patient. Objective     Visit Vitals  /72   Pulse (!) 103   Temp 97.1 °F (36.2 °C)   Resp 18   Ht 5' 5\" (1.651 m)   Wt 53.5 kg (118 lb)   SpO2 100%   Breastfeeding No   BMI 19.64 kg/m²     Heart rate is mildly elevated. Blood pressure is a stable. No results found for this or any previous visit (from the past 24 hour(s)). Mental Status Examination     Appearance/Hygiene 63 y.o. BLACK/ female  Hygiene: Fair to limited   Behavior/Social Relatedness  withdrawn, suspicious, agitated at times.    Musculoskeletal Gait/Station: appropriate  Tone (flaccid, cogwheeling, spastic): not assessed  Psychomotor (hyperkinetic, hypokinetic): calm   Involuntary movements (tics, dyskinesias, akathisa, stereotypies): none   Speech   Rate, rhythm, volume, fluency and articulation are appropriate   Mood   labile   Affect    irritable   Thought Process Linear and goal directed   Thought Content and Perceptual Disturbances Denies self-injurious behavior (SIB), suicidal ideation (SI), aggressive behavior or homicidal ideation (HI)  Denies auditory and visual hallucinations, however remains still rather delusional.  She is hypervigilant, paranoid. Sensorium and Cognition  Grossly intact   Insight  poor   Judgment  poor        Assessment/Plan      Psychiatric Diagnoses:   Patient Active Problem List   Diagnosis Code    Delusional disorder ([facility] F22       Medical Diagnoses: Same plus labile hypertension    Psychosocial and contextual factors: Same    Level of impairment/disability: Severe at present    1. Olanzapine, 2.5 mg every bedtime was prescribed. The patient has not taking any of the doses at least as of now. Court hearing will be tomorrow. 2.  Reviewed instructions, risks, benefits and side effects of medications  3.   Disposition/Discharge Date: self-care/home, TBD    Chacho Wilkerson MD, [facility]  Psychiatry

## 2021-11-07 NOTE — BH NOTES
Pt presented to nurses desk expressing anxiety over court proceeding tomorrow. Pt reassured and explained court process. Pt encouraged to take Zyprexa this evening, provided patient education sheet. After reviewing rx education pt states she uses \"natural remedies because I'm a Rastafarian\" and therefore she has a \"Yarsanism exemption\". Pt states she is \"innocent\", and \"has no money\", therefore there is no reason for her to be detained. Pt again reassured. Will continue to provide support as needed.

## 2021-11-07 NOTE — GROUP NOTE
TANIKA  GROUP DOCUMENTATION INDIVIDUAL                                                                          Group Therapy Note    Date: 11/6/2021    Group Start Time: 1300  Group End Time: 1330  Group Topic: Nursing    [address] ADULT [address]M [address], RN     [facility]    Group Therapy Note    Attendees: 4         Attendance: Did not attend      Stacie Hooker RN

## 2021-11-07 NOTE — BH NOTES
Patient declined to take scheduled Zyprexa 2.5 mg tab PO this evening. Patient was given much encouragement by both nurses and continued to question dose. Patient asked multiple times \"can I just take half of it? I just want to take half. \"    Patient informed medication could not be halved due to doctor's order for 2.5 mg dose. Patient continued to ask same question as above. Patient voiced she will talk to doctor in the morning about \"halving the dose. \"

## 2021-11-07 NOTE — PROGRESS NOTES
Problem: Psychosis  Goal: *STG: Remains safe in hospital  Description: Pt will remain safe in hospital daily during this admission. Outcome: Progressing Towards Goal  Goal: *STG/LTG: Complies with medication therapy  Description: Pt will be medication compliant daily. Outcome: Progressing Towards Goal     Problem: Psychosis  Goal: *STG: Decreased delusional thinking  Description: Pt will experience a decrease in delusional daily during this admission. Outcome: Not Progressing Towards Goal    Katherleen Skiff is refusing all medications. She has eaten her meals. She remains paranoid and feeling as though \"I'm not going to make it out of here alive\", believing she will be harmed from medication if she takes it. She is free from falls and harm. She utilizes the phone to communicate to her daughter but is otherwise withdrawn to self. She expresses anxiety over court evaluation tomorrow. Will continue to provide support as needed.

## 2021-11-07 NOTE — GROUP NOTE
TANIKA  GROUP DOCUMENTATION INDIVIDUAL                                                                          Group Therapy Note    Date: 11/7/2021    Group Start Time: 8432  Group End Time: 56  Group Topic: Recreational/Music Therapy    [facility] 1 ADULT CHEM [address], RN     [facility]    Group Therapy Note    Attendees: 6         Attendance: Did not attend      Cole Kelly RN

## 2021-11-08 PROCEDURE — 99232 SBSQ HOSP IP/OBS MODERATE 35: CPT | Performed by: PSYCHIATRY & NEUROLOGY

## 2021-11-08 PROCEDURE — 65220000003 HC RM SEMIPRIVATE PSYCH

## 2021-11-08 RX ORDER — LORAZEPAM 2 MG/ML
1 INJECTION INTRAMUSCULAR
Status: DISCONTINUED | OUTPATIENT
Start: 2021-11-08 | End: 2021-11-16 | Stop reason: HOSPADM

## 2021-11-08 NOTE — PROGRESS NOTES
[address] [facility] [address]  Inpatient Progress Note     Date of Service: 11/08/21  Hospital Day: 6     Subjective/Interval History   11/08/21    Treatment Team Notes:  Notes reviewed and/or discussed and report that Julieanne Frankel is a patient with a history of delusional disorder, who required to be detained over the weekend, (a TDO was obtained upon the patient's discharge request), who was presented before the local courts today. The patient indicated that the courts here for the choice of signing herself in for 3 days inpatient hospital stay, or to be committed to involuntary inpatient treatment if the court found reasons to do so. Even though we have not been able to confirm this through the courts, it appears that the patient signed in for an involuntary a stay of 3 more days. Patient interview: Julieanne Frankel was interviewed by this writer today. So during our session today, we explained to the patient the consequences of her agreeing to the above 72 hours of involuntary inpatient treatment, and what could happen if by the end of this period of time, she is considered to be in the need of further inpatient treatment. Specifically we mentioned that we will have the opportunity of requesting another detainment then. So we suggested to her that to increase her chances to be able to be discharged, he should consider taking the medications prescribed for her since we are concerned that her level of paranoia discharge that she is a still needed to be considered unable to appropriately take care of self. The patient was advised also that she has the opportunity to refuse medicines as she has done through this admission, in addition to our not being able due to the patient involuntarily committed herself, to request an order for involuntary administration of medications.   So will depend upon how the patient does still quite we will be able to suggest by the end of the 72 hours inpatient treatment. Objective     Visit Vitals  BP (!) 160/85   Pulse (!) 110   Temp 98.6 °F (37 °C)   Resp 20   Ht 5' 5\" (1.651 m)   Wt 53.5 kg (118 lb)   SpO2 100%   Breastfeeding No   BMI 19.64 kg/m²     Blood pressure elevated this morning with increased heart rate possibly associated to the upcoming court hearing. Staff has been asked to be rechecked. No results found for this or any previous visit (from the past 24 hour(s)). Mental Status Examination     Appearance/Hygiene 63 y.o. BLACK/ female  Hygiene: Limited   Behavior/Social Relatedness  suspicious of others   Musculoskeletal Gait/Station: appropriate  Tone (flaccid, cogwheeling, spastic): not assessed  Psychomotor (hyperkinetic, hypokinetic): Agitated at times  Involuntary movements (tics, dyskinesias, akathisa, stereotypies): none   Speech   Rate, rhythm, volume, fluency and articulation are appropriate   Mood   labile   Affect    irritability has lessened   Thought Process Linear and goal directed   Thought Content and Perceptual Disturbances Denies self-injurious behavior (SIB), suicidal ideation (SI), aggressive behavior or homicidal ideation (HI)  Denies auditory and visual hallucinations, however she remains rather delusional   Sensorium and Cognition  Grossly intact   Insight  Limited   Judgment  Limited        Assessment/Plan      Psychiatric Diagnoses:   Patient Active Problem List   Diagnosis Code    [facility] F22       Medical Diagnoses: Plus labile hypertension    Psychosocial and contextual factors: Same    Level of impairment/disability: Severe     1. The patient was provided with copies of written's statements regarding olanzapine modes of action, and potential for developing side effects and adverse effects. The undersigned by the rehab already discussed this with the patient however she wanted written information which was provided.   During session today again strong suggestion followed regarding the patient's need to comply with medications as prescribed. In addition, explanation follow regarding the implications of voluntary commitments. As of now, we have not received confirmation of the patient's current status. Staff has been asked to get information. 2.  Reviewed instructions, risks, benefits and side effects of medications  3.   Disposition/Discharge Date: self-care/home, TBD    Belen Gray MD, [address] [place] [address]

## 2021-11-08 NOTE — BH NOTES
SBAR report received from [place]. Behavioral health rounds completed. Pt does not appear to be in distress. Pt encouraged to seek staff for questions and concerns.

## 2021-11-08 NOTE — BH NOTES
Pt.is depressed,isolative , guarded, paranoid and anxious. She refused to take PRN medication for anxiety. She still refusing to eat meals and will only drink supplemental ensure. Will continue to monitor for safety and provide support as needed.

## 2021-11-08 NOTE — BSMART NOTE
SW Contact:       Pt is a 66 year old female who presented to the emergency room with c/o \"I feel paranoid. I feel like somebody is out to get me. I'm afraid to be alone. I can't sleep. My two dogs , and I really love my dogs. My son has been arrested for murder. My son is a good person, and we don't believe that he murdered somebody. \" pt remains guarded. No meds since  ?? No outpt tx also since then. Lives with son. Dghter listed above also supportive. She's unemployed since 2018,  after being fired from [employer].     CLINICAL IMPRESSION:  AXIS I:  Delusional disorder. AXIS II:  Noncontributory. AXIS III:  Hypertension by history.  History of left breast biopsy with negative results.  Remote history of right elbow surgery in , specifics not known     Intervention: Pt still suspicious & not wanting to speak with writer about anything other than d/c. Explained to her d/c contingent on compliance with out pt tx plan, which was concept pt struggled with, despite several explanations. At this point she still voiced concerns over meds. Writer gave her some input as well as encouraging her to speak with charge nurse for specifics. Dr & tx team updated.

## 2021-11-08 NOTE — PROGRESS NOTES
Problem: Psychosis  Goal: *STG: Decreased delusional thinking  Description: Pt will experience a decrease in delusional daily during this admission. Outcome: Not Progressing Towards Goal  Goal: *STG: Remains safe in hospital  Description: Pt will remain safe in hospital daily during this admission. Outcome: Progressing Towards Goal  Goal: *STG/LTG: Complies with medication therapy  Description: Pt will be medication compliant daily. Outcome: Not Progressing Towards Goal  Patient observed sitting in day area , guarded, paranoid. Refused to take morning medication, did not acknowledge her name being called for morning medications.

## 2021-11-09 PROCEDURE — 99232 SBSQ HOSP IP/OBS MODERATE 35: CPT | Performed by: PSYCHIATRY & NEUROLOGY

## 2021-11-09 PROCEDURE — 65220000003 HC RM SEMIPRIVATE PSYCH

## 2021-11-09 NOTE — BSMART NOTE
ART THERAPY GROUP PROGRESS NOTE    Group time:1345    The patient was unavailable for group. She was on the phone when this writer arrived and for the majority of group. She did join the last 10 minutes during group discussion and shared that she wants to work on changing negative thought patterns into positive ones.

## 2021-11-09 NOTE — BH NOTES
SBAR report received from [place]. Behavioral health rounds completed. Pt does not appear to be in distress. Pt encouraged to seek staff for questions and concerns.

## 2021-11-09 NOTE — GROUP NOTE
[facility] GROUP DOCUMENTATION INDIVIDUAL                                                                          Group Therapy Note    Date: 11/8/2021    Group Start Time: 2000  Group End Time: 2030  Group Topic: Medication    [facility] 1 ADULT CHEM DEP    Lupillo Mari RN    IP [address] [facility] GROUP DOCUMENTATION GROUP    Group Therapy Note    Attendees: 6         Attendance: Did not attend          Additional Notes:  Pt.was encouraged to attend  to decline to  join the group and refused to take medication.     Hilda Meredith RN

## 2021-11-09 NOTE — PROGRESS NOTES
Problem: Psychosis  Goal: *STG: Decreased delusional thinking  Description: Pt will experience a decrease in delusional daily during this admission. Outcome: Progressing Towards Goal  Goal: *STG: Remains safe in hospital  Description: Pt will remain safe in hospital daily during this admission. Outcome: Progressing Towards Goal  Goal: *STG/LTG: Complies with medication therapy  Description: Pt will be medication compliant daily. Outcome: Progressing Towards Goal     Problem: Hypertension  Goal: *Blood pressure within specified parameters  Outcome: Progressing Towards Goal   Patient states she did not sleep last night but she slept during the day. She refused morning medications. Patient has decrease in paranoia. She is guarded.

## 2021-11-09 NOTE — BH NOTES
Pt. refused to take evening dose of ZyPrexa in spite of the explaination. .Pt. stated that she told the  in the court  today that she is only going to take holistic medication. Pt. remain paranoid and delusional.Will continue to monitor and contract for safety.

## 2021-11-09 NOTE — PROGRESS NOTES
[address] [facility] [address]  Inpatient Progress Note     Date of Service: 11/09/21  Hospital Day: 7     Subjective/Interval History   11/09/21    Treatment Team Notes:  Notes reviewed and/or discussed and report that Emeterio Hartmann is a patient with a history of delusional thinking, presented before the local courts yesterday at which time the courts agree with to the patient signing in for involuntary commitment. This provides the undersigned and the facility 72 hours to be able to help the patient, however every attempt for her to be able to take medications as prescribed have failed based upon her refusal to do so. Patient interview: Emeterio Hartmann was interviewed by this writer today. During our individual session which was lengthy, the undersigned brought to the patient's attention the scenario consistent on our requesting the courts for the patient to take medications involuntarily. However I also expressed concerns about the fact that in general delusional patients tend to respond very minimally to antipsychotic treatment. The patient did mention that she refused to take any medications and so if the courts were to decide to agree upon our request for involuntary medications administration, this will be done parenterally against the patient's will. Nonetheless it is obvious that the patient will not continue to take her medications after she is discharged. She has been very clear about it. So we are considering the possibility of proceeding with discharge tomorrow with outpatient treatment referrals. One of the reasons for which we are considering that type of discharge is because the patient herself even though she gets somewhat agitated at times, has not been found to be actively dangerous to self nor others. She continues to denied any thoughts of harming self, denies any thoughts of harming anyone else.   She is however afraid of someone else harming her which is what brought her into the hospital.  We will present the patient before the treatment team tomorrow at which time decision will be made as a stated. Objective     Visit Vitals  BP (!) 145/80   Pulse (!) 101   Temp 98.6 °F (37 °C)   Resp 16   Ht 5' 5\" (1.651 m)   Wt 53.5 kg (118 lb)   SpO2 100%   Breastfeeding No   BMI 19.64 kg/m²     Blood pressure was somewhat elevated today, with a mild elevation of heart rate also noted. This usually happens when the patient agrees to get her blood pressure done. She is obviously very anxious when staff is doing so. No results found for this or any previous visit (from the past 24 hour(s)). Mental Status Examination     Appearance/Hygiene 63 y.o. BLACK/ female  Hygiene: Fair   Behavior/Social Relatedness  less agitated, spending more time in the general patient's area   Musculoskeletal Gait/Station: appropriate  Tone (flaccid, cogwheeling, spastic): not assessed  Psychomotor (hyperkinetic, hypokinetic): calm this morning  Involuntary movements (tics, dyskinesias, akathisa, stereotypies): none   Speech   Rate, rhythm, volume, fluency and articulation are appropriate   Mood   lability has improved   Affect    irritability has improved   Thought Process Linear and goal directed   Thought Content and Perceptual Disturbances Denies self-injurious behavior (SIB), suicidal ideation (SI), aggressive behavior or homicidal ideation (HI)  Denies auditory and visual hallucinations, however she remains delusional   Sensorium and Cognition  Grossly intact   Insight  Limited   Judgment  Limited        Assessment/Plan      Psychiatric Diagnoses:   Patient Active Problem List   Diagnosis Code    Delusional disorder ([facility] [address] F22       Medical Diagnoses: Same plus labile hypertension    Psychosocial and contextual factors: Same    Level of impairment/disability: Severe due to lack of compliance    1. The patient will be presented to the treatment team tomorrow.   The first decision that we need to make is if the patient will require to remain in the facility and without with her agreeing to do so, she will have to have another TDO evaluation. In my opinion is currently at question if the CSB would agree to the TDO to be obtained. If the TDO is not agreed upon the patient will have to be discharged. If the CSB agrees to another temporary detaining order to be obtained, the decision will have to be made regarding a request for the involuntary administration of medications to the courts. However we remain concerned regarding what in general is limited treatment response for individuals with a delusional disorder, knowing also that the patient will immediately after the being discharged from the hospital will stop taking medications as prescribed. So the decision will be made again during the treatment team tomorrow. 2.  Reviewed instructions, risks, benefits and side effects of medications  3.   Disposition/Discharge Date: OSCAR Lewis MD, [facility]

## 2021-11-09 NOTE — BH NOTES
The patient has been awake all night in the dayarea the patient moved her mattress by the doorway, and has been squatting by the doorway and pacing the dayarea and hallways all night will continue to follow current POC and interventions per policies/protocols.

## 2021-11-09 NOTE — BSMART NOTE
ART THERAPY GROUP PROGRESS NOTE    PATIENT SCHEDULED FOR GROUP AT: [id number]    ATTENDANCE: Full    PARTICIPATION LEVEL: Participates fully in the art process    ATTENTION LEVEL: Able to focus on task    FOCUS: Mindfulness    SYMBOLIC & THEMATIC CONTENT AS NOTED IN IMAGERY: She was calm, compliant, and invested in the task at hand.  She claimed that she is ready for discharge and can't wait to \"be free outside of the hospital.\"

## 2021-11-09 NOTE — BH NOTES
The patient is currently in the [facility] stating she is scared to go to her room and scared to be in the hospital. When thi writer asked the patient to further explained the reason why she is scared to go to her room and scared to be in the hospital, the patient stated she didn't want to explain to me the reason why. The patient then stated the physician told her she didn't have to take any medications. Patient is currently sitting in the chair by the door will continue to follow current POC and interventions per policies/protocols.

## 2021-11-09 NOTE — BSMART NOTE
SOCIAL WORK GROUP  11/9/21    Recreation  Group     Group Social work   Attendance declined   Number of participants 5   Time in 12:35   Time out 1:15   Total Time 45   Interventions/techniques Informed,    Interactions Was in day area       Vaughan Rinne MA, LMHP-R

## 2021-11-09 NOTE — BSMART NOTE
SW Contact:      Pt is a 66 year old female who presented to the emergency room with c/o \"I feel paranoid. I feel like somebody is out to get me. I'm afraid to be alone. I can't sleep. My two dogs , and I really love my dogs. My son has been arrested for murder. My son is a good person, and we don't believe that he murdered somebody. \" pt remains guarded. No meds since  ??  No outpt tx also since then. Lives with son. Dghter listed above also supportive. She's unemployed since 2018, Jhon Torres being fired from [employer].     CLINICAL IMPRESSION:  AXIS I:  Delusional disorder. AXIS II:  Noncontributory. AXIS III:  Hypertension by history.  History of left breast biopsy with negative results.  Remote history of right elbow surgery in , specifics not known . Intervention: pt continues to be focused on d/c and refused to speak with writer anymore if she was not let go. Again explained Dr Melisa gonzales team will continue to assess her for d/c. Dr Melisa gonzales team updated.

## 2021-11-09 NOTE — BH NOTES
Pt remains paranoid this evening. Pt states, \"I don't want to get COVID up here. I don't need to be up here. \" Pt presents with dull affect, anxious mood, poor insight. No behavioral outbursts noted. Pt denies SI/HI at this time. Will continue to monitor.

## 2021-11-10 PROCEDURE — 74011250636 HC RX REV CODE- 250/636: Performed by: PSYCHIATRY & NEUROLOGY

## 2021-11-10 PROCEDURE — 99232 SBSQ HOSP IP/OBS MODERATE 35: CPT | Performed by: PSYCHIATRY & NEUROLOGY

## 2021-11-10 PROCEDURE — 65220000003 HC RM SEMIPRIVATE PSYCH

## 2021-11-10 RX ADMIN — LORAZEPAM 1 MG: 2 INJECTION INTRAMUSCULAR; INTRAVENOUS at 06:14

## 2021-11-10 RX ADMIN — HALOPERIDOL LACTATE 2 MG: 5 INJECTION, SOLUTION INTRAMUSCULAR at 06:14

## 2021-11-10 NOTE — BH NOTES
Patient refused medication Zyprexa, patient stated she didn't have to take medication will continue to follow current POC and interventions per policies/protocols.

## 2021-11-10 NOTE — BH NOTES
The patient stated that she wants to go to another facility because the food here is not safe and it is not prepackaged, and she is not getting enough nutrients. Patient was offered a pre packaged TV dinner, the patient declined and stated maybe ill try it tomorrow. The patient is currently sitting in the dayarea staring at the walls, and looking around frantically, patient offered PRN medications the patient refused will continue to follow current POC and interventions per policies/rotocols.

## 2021-11-10 NOTE — BSMART NOTE
Pt. Is a 63year old female with history of Delusional Disorder. Pt. Was admitted for psychosis . The team met with discuss pt. The pt. Was going to be dc today; however, lost control last night and attempted to go  Over the  nurses station grab computers and phone. Pt had to be medicated. Pt. Was too sedated to participate in treatment team.  The psychiatrist will request for a TDO to encouraged medication compliance and reduce paranoia and delusion. Pt. Has poor insight and judgment. Covering SW will provide pt with support until assign sw returns. SW Collateral: Pt.'s daughter called from [phone]. The daughter states she lives in [place]. The daughter reports pt. Lives with a brother. Pt per daughter has been off medication for 3 years. Pt has been has paranoid thoughts and delusions , thinking someone is trying to harm her. Pt also per daughter threaten to harm self last week The daughter expressed concerns that if her mother is discharged, she is unable in current state to care for self. The daughter is hoping if pt. Becomes compliant with treatment, she get s stable on medications and is able to function , care for self and or daughter can have pt to come and live with her.

## 2021-11-10 NOTE — PROGRESS NOTES
Nutrition Assessment     Type and Reason for Visit: Reassess, Consult    Nutrition Recommendations/Plan:   - Continue to provide pre-packaged foods with Ensure Enlive, TID.  - Monitor and encourage po intake as tolerated. Nutrition Assessment:  Pt sleeping during visit this morning, noted pt required IM Ativan & Haldol this morning due to agitation. Per nursing pt groggy this morning resulting in poor intake of breakfast meal. Variable po intake since admission due to paranoia, pt rewashing salads prior to consumption and requesting pre-packaged foods. Does best with breakfast meals & does consume Ensure supplements. Malnutrition Assessment:  Malnutrition Status: At risk for malnutrition (specify) (paranoia, only eating pre-packaged meals)     Estimated Daily Nutrient Needs:  Energy (kcal):  6682-3246  Protein (g):  43-65       Fluid (ml/day):  8753-1113    Nutrition Related Findings:  None      Current Nutrition Therapies:  ADULT DIET Regular; Vegetarian (Lacto-Ovo); Pescatarian. Please send pre-packaged meals. Pt states she does not eat bread or meat. Will eat fish, eggs, dairy, beans fruits & vegetables.   ADULT ORAL NUTRITION SUPPLEMENT Breakfast, Lunch, Dinner; Standard High Calorie/High Protein    Anthropometric Measures:  · Height:  5' 5\" (165.1 cm)  · Current Body Wt:  53.5 kg (118 lb)  · BMI: 19.6    Nutrition Diagnosis:   · Predicted inadequate energy intake related to psychological cause or life stress (paranoia) as evidenced by  (variable po intake since admission, requesting pre-packaged foods due to paranoia)    Nutrition Intervention:  Food and/or Nutrient Delivery: Continue current diet, Continue oral nutrition supplement  Nutrition Education and Counseling: No recommendations at this time, Education not indicated  Coordination of Nutrition Care: Continue to monitor while inpatient (pt discussed with nursing)    Goals:  PO nutrition intake will meet >75% of patient estimated nutritional needs within the next 7 days.        Nutrition Monitoring and Evaluation:   Behavioral-Environmental Outcomes: Beliefs and attitudes  Food/Nutrient Intake Outcomes: Food and nutrient intake, Supplement intake  Physical Signs/Symptoms Outcomes: Meal time behavior, Nutrition focused physical findings    Discharge Planning:    Continue current diet, Continue oral nutrition supplement     Electronically signed by Skip Reeves RD on 11/10/2021 at 10:55 AM    Contact Number: [phone]

## 2021-11-10 NOTE — BH NOTES
Patient daughter called at [phone] regarding the patient and stated that the patient has been admitted to [facility] several times in the past, and that the patient has not worked at [employer] for over 5 years. The patient brother lives with the patient but is not a reliable caregiver, and the patient son was also a caregiver of the patient but was locked up 2 months ago accused of murder. The patient daughter lives in [place] and states that the patient has always been depressed and paranoid and that her father use to be the caregiver to the patient until they got a divorce several years ago and the patient was also taking medications. The patient daughter states the patient has made statements of going into the woods and killing herself several times. The patient daughter feels the patient is unsafe to go home due to the patient not having a reliable caregiver, paranoid, and making suicidal statements. The patient daughter is requesting the physician to call her the patient daughter name is George Purdy and stated she can be reached -at anytime at [phone]. The patient has only slept an hour during the entire shift, and has sat up in a chair in the hallway all night long. When staff approaches patient to check on the patient to offer fluid and food the patient becomes irritable and agitated will continue to follow current POC and interventions per policies/protocols.

## 2021-11-10 NOTE — PROGRESS NOTES
Physician evaluation   Jose C Figueroa      I have evaluated the patient after initiation of intervention. The patient has required the administration of ativan due to agitation, creating an unsafe environment for self or others, disturbing equipment necessary for medical treatments such as IVs and intubation tubes. The patient is unharmed, resting comfortably. The patient's current medical and behavioral conditions that warrant the use intervention include danger to self and danger to others and Violent/Aggressive.          Racheal Davila MD PGY-1   [facility] Medicine   November 10, 2021, 6:54 AM

## 2021-11-10 NOTE — BH NOTES
Pt noted to be significantly sedated from earlier IM medication administration upon approach. Pt unable to stand from chair. Pt ambulated to treatment team via wheelchair, but pt unable to participate due to sedation. Pt taken back to her room and encouraged to lie down until she feels a decrease in the sedative effects. 11:23 Pt observed sleeping in bed, respirations 18.    12:45 Pt talking with CSB in room. CSB left approx 13:08.    14:23 Pt again observed sleeping in bed, respirations 16.

## 2021-11-10 NOTE — PROGRESS NOTES
Problem: Psychosis  Goal: *STG: Decreased delusional thinking  Description: Pt will experience a decrease in delusional daily during this admission. Outcome: Not Progressing Towards Goal  Goal: *STG: Participates in individual and group therapy  Description: Pt will participates in individual and group therapy daily while hospitalized. Outcome: Not Progressing Towards Goal  Goal: *STG/LTG: Complies with medication therapy  Description: Pt will be medication compliant daily. Outcome: Not Progressing Towards Goal    Tristian Graff is neither med nor meal compliant. Approx 6am pt became disruptive and jumped into nurses station, where pt promptly received Ativan and Haldol IM. Due to sedation effects pt has been unable to attend groups, though she is getting rest. Pt only slept about 1.5 hours prior to this event. She is free from falls and harm. Will continue to provide support as needed.

## 2021-11-10 NOTE — BSMART NOTE
ART THERAPY GROUP PROGRESS NOTE    Group time:1015    The patient was not disturbed for group at staff discretion.

## 2021-11-10 NOTE — BH NOTES
NENO Note: Rounds were done at the beginning of the shift by the shift that was getting ready to hand the shift over.

## 2021-11-10 NOTE — BH NOTES
The patient slept in the [facility] for one hour then the patient pulled her mattress out in the day area but the patient is standing against the wall looking at a computer, and constantly watching the staff will continue to follow current POC and interventions per policies/protocols.

## 2021-11-10 NOTE — PROGRESS NOTES
[address]  Inpatient Progress Note     Date of Service: 11/10/21  Hospital Day: 8     Subjective/Interval History   11/10/21    Treatment Team Notes:  Notes reviewed and/or discussed and report that Julio Camp is a patient with a history of delusional disorder, allowed to sign in for involuntary commitment when the case was presented before the local courts 3 days ago. The patient was brought in to our treatment team session this morning, however she was found to be sedated and unable to answer questions appropriately. This is the result of the patient requiring the administration of a combination of haloperidol and lorazepam intramuscularly after she became so agitated that attempted to call him behind the nurses station, began to grab computers and other items, and jumped over and the nurse desk. The physician on-call place a holding order with [facility] physician being notified unable to perform shortly or after the face-to-face following holding orders protocol. Attention is invited to the family medicine resident's note which is self-explanatory. Patient interview: Julio Camp was interviewed by this writer today. Sedated as I had above-mentioned, during the treatment team session the undersigned brought up the intent as of yesterday before all of this happened to discharge the patient today. However due to the above-mentioned behaviors, we will have to request the [facility] to reevaluate the patient at this time for another temporary detaining order. If the patient is detained, she will be presented before the local courts at which time we will suggest that the patient should not be allowed to sign in for involuntary commitment, and if the court agreed to commit her then, and request for administration of medications against her will, will follow.       Objective     Visit Vitals  /80 (BP 1 Location: Right upper arm, BP Patient Position: Sitting)   Pulse 100   Temp 97.5 °F (36.4 °C)   Resp 16   Ht 5' 5\" (1.651 m)   Wt 53.5 kg (118 lb)   SpO2 100%   Breastfeeding No   BMI 19.64 kg/m²     Vitals are stable    No results found for this or any previous visit (from the past 24 hour(s)). Mental Status Examination     Appearance/Hygiene 63 y.o. BLACK/ female  Hygiene: Limited   Behavior/Social Relatedness  sedated, with potential for acting out aggressive behaviors described above   Musculoskeletal Gait/Station: Not assessed  Tone (flaccid, cogwheeling, spastic): not assessed  Psychomotor (hyperkinetic, hypokinetic): calm   Involuntary movements (tics, dyskinesias, akathisa, stereotypies): none   Speech   garbled this morning   Mood   labile by history   Affect    irritable by history   Thought Process  sedated this morning   Thought Content and Perceptual Disturbances  strong paranoid ideation with control loss and aggressive behaviors as described above   Sensorium and Cognition  currently sedated, however in the past has shown no evidence of improvement   Insight  poor   Judgment  poor        Assessment/Plan      Psychiatric Diagnoses:   Patient Active Problem List   Diagnosis Code    Delusional disorder ([facility] [place] [address] F22       Medical Diagnoses: Plus labile hypertension    Psychosocial and contextual factors: Same    Level of impairment/disability: Severe    1. Please see nursing notes regarding yesterday's behaviors, and so the reason for which she required to be held with being provided also with the medications ordered as needed. Sedation was the only side effect that the patient shown with calamine behaviors noted. Please see above regarding the reason for which a new TDO request is being ordered through the local CSB. 2.  Reviewed instructions, risks, benefits and side effects of medications  3.   Disposition/Discharge Date: OSCAR Bobby MD, [facility]  Psychiatry
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
## 2021-11-10 NOTE — BH NOTES
Debriefing done with staff and patient will continue to follow current POC and interventions per policies/protocols.

## 2021-11-10 NOTE — BSMART NOTE
ART THERAPY GROUP PROGRESS NOTE    Group time:1315    The patient did not awaken/get up when called for group.
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
## 2021-11-10 NOTE — GROUP NOTE
TANIKA  GROUP DOCUMENTATION INDIVIDUAL                                                                          Group Therapy Note    Date: 11/10/2021    Group Start Time: 3899  Group End Time: 3525  Group Topic: Nursing    [facility] 1 ADULT CHEM [address], RN    IP [address] [facility] GROUP DOCUMENTATION GROUP    Group Therapy Note    Attendees: 5         Attendance: Did not attend    Additional Notes:  Pt too sedated to attend groups.     Ling Shelton RN

## 2021-11-10 NOTE — BH NOTES
Pt slept for about an hour and 15 mins. She was at times paranoid/hypervigilant and anxiety was increased at some times. When assessed for anxiety pt became defensive and questioned, \"Should I be worried? \"  Will continue to monitor and support as needed.

## 2021-11-10 NOTE — BH NOTES
The patient daughter called stating she was worried about her mother because she is not getting better and the patient just called her and told her that she would be leaving tomorrow, the patient daughter stated she is not ready for her mother to come home until she is better, the daughter stated that her mother is not eating, and is not safe to come home, the patient daughter also stated that the patient use to be on medications but unsure of the name or dosage. . The patient also told her daughter that she is on a COVID floor and scared that she has COVID. The patient was reassures by the staff she was safe. The patient is hyper [employer] and stating that GOD knows everything. Patient continues to be paranoid, and afraid to go in her room, afraid to eat meal trays, afraid to go to sleep will continue to follow current POC and interventions per policies/protocols.
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
## 2021-11-10 NOTE — GROUP NOTE
TANIKA  GROUP DOCUMENTATION INDIVIDUAL                                                                          Group Therapy Note    Date: 11/9/2021    Group Start Time: 2000  Group End Time: 2030  Group Topic: Medication    [facility] 1 ADULT CHEM DEP    Derik Cox RN    IP [address] [facility] GROUP DOCUMENTATION GROUP    Group Therapy Note    Attendees: 12         Attendance: Did not attend    Patient's Goal:  Understanding the benefits and possible side effects of medication being dispensed as prescribed. Additional Notes:  Pt has been isolated to self. Refused scheduled medication. Education provided on benefits of taking medication as prescribed and she still refused. Will continue to monitor and support as needed.      Kristopher Angel RN

## 2021-11-11 PROCEDURE — 65220000003 HC RM SEMIPRIVATE PSYCH

## 2021-11-11 PROCEDURE — 99232 SBSQ HOSP IP/OBS MODERATE 35: CPT | Performed by: PSYCHIATRY & NEUROLOGY

## 2021-11-11 NOTE — BSMART NOTE
SW Contact:      Pt is a 66 year old female who presented to the emergency room with c/o \"I feel paranoid. I feel like somebody is out to get me. I'm afraid to be alone. I can't sleep. My two dogs , and I really love my dogs. My son has been arrested for murder. My son is a good person, and we don't believe that he murdered somebody. \" pt remains guarded. No meds since  ??  No outpt tx also since then. Lives with son. Dghter listed above also supportive. She's unemployed since 2018, [employer] being fired from [facility] [employer].     CLINICAL IMPRESSION:  AXIS I:  Delusional disorder. AXIS II:  Noncontributory. AXIS III:  Hypertension by history.  History of left breast biopsy with negative results.  Remote history of right elbow surgery in , specifics not known . Interaction: pt continues to be upset with [address] for not d/c. Pt still has no interest in med compliance.

## 2021-11-11 NOTE — GROUP NOTE
TANIKA  GROUP DOCUMENTATION INDIVIDUAL                                                                          Group Therapy Note    Date: 11/10/2021    Group Start Time: 1945  Group End Time: 2015  Group Topic: Medication    [facility] 1 ADULT CHEM DEP    Erasto Gomez RN    IP [address] [facility] GROUP DOCUMENTATION GROUP    Group Therapy Note    Attendees: 10         Attendance: Did not attend            Additional Notes:  Pt.is resting and refused to get up to join the group.     Eber Mendiola RN

## 2021-11-11 NOTE — BSMART NOTE
ART THERAPY GROUP PROGRESS NOTE    PATIENT SCHEDULED FOR GROUP AT: 10:00    ATTENDANCE: Full    PARTICIPATION LEVEL: Needs only minimal encouragement    ATTENTION LEVEL: Able to focus on task    FOCUS: Mindfulness     SYMBOLIC & THEMATIC CONTENT AS NOTED IN IMAGERY: She initially was sitting to herself outside of group and needed encouragement to join, in which she was responsive. Focus on [employer] and themes of need for \"protection\" were noted in imagery and associations. She depicted herself \"here at the hospital with angels and Dominick surrounding her\" in order to make her feel \"safe and protected. \"

## 2021-11-11 NOTE — PROGRESS NOTES
Problem: Falls - Risk of  Goal: *Absence of Falls  Description: Document Jero Oquendo Fall Risk and appropriate interventions in the flowsheet. Pt will have no fall daily during this admission. Outcome: Progressing Towards Goal  Note: Fall Risk Interventions:  Medication Interventions: Teach patient to arise slowly       Problem: Psychosis  Goal: *STG: Remains safe in hospital  Description: Pt will remain safe in hospital daily during this admission. Outcome: Progressing Towards Goal    Patient has been in day room most of day and evenings shifts. Patient attended groups and was active participant. Patient appears much less anxious than last shift with this nurse (11/07/2021). Patient has eaten meals, only items pre-packaged instead of meals sent from cafeteria, however this also is an improvement from this past weekend. Patient has been noted to interact with other peers who have also been on unit for approximately same amount of time as patient. Patient has had little to no interactions with \"newer\" peers on unit. Patient has performed ADL's with no assistance or prompting. Patient has been compliant with unit guidelines, free from falls and harm. Will continue to monitor and provide interventions and reassurance as appropriate.

## 2021-11-11 NOTE — BH NOTES
NENO Note: The above pt has been quiet the entire shift. She has been on the phone with family and friends which appeared to have went well this shift. She has been still continue to be focused on sealed foods this shift. She complained of her lunch however she requested a T.V. dinner for lunch. She verbalized \"I want something sealed\". She has not been aggressive agitated or demanding this shift.

## 2021-11-11 NOTE — PROGRESS NOTES
[address]  Inpatient Progress Note     Date of Service: 11/11/21  Hospital Day: 9     Subjective/Interval History   11/11/21    Treatment Team Notes:  Notes reviewed and/or discussed and report that Bertha Canales is a patient with a history of delusional disorder, requiring yesterday to be reevaluated by the crisis services with [facility] behavioral health, this resulting on a new temporary detaining order being obtained from a local . Patient interview: Bertha Canales was interviewed by this writer today. During our session today, we brought up again yesterday incidents which prompted the above-mentioned TDO request which was granted as a stated. The patient remains rather delusional, with yesterday episode prompting a change of plans regarding her discharge. Prior to the incident we were hopeful that the patient was going to be able to be discharged home even though having to be without any medications since she continued to refuse them. However the patient became acutely agitated with the undersigned note for November 10 being self-explanatory. The patient was basically sedated by the coadministration of lorazepam and haloperidol and was able to sleep during the day and also had a good night sleep. However this morning she continues to indicate that what happened was related to her being afraid of being hurt with her delusional thoughts including some of the staff members that were trying to calm her down. Since she is basically unpredictable, and when becoming agitated she becomes potentially dangerous to self and others,\" during the court hearing tomorrow, we will be requesting the courts permission for the administration of medications against the patient's will. She obviously responded from an agitation point of view, however the response was intense taking consideration the low dose that she was provided with.   So she is obviously sensitive to medications this to be taking consideration regarding which medication we are going to request the court to approve. Objective     Visit Vitals  BP (!) 150/72 (BP 1 Location: Right upper arm, BP Patient Position: Sitting)   Pulse (!) 103   Temp 97.3 °F (36.3 °C)   Resp 16   Ht 5' 5\" (1.651 m)   Wt 53.5 kg (118 lb)   SpO2 100%   Breastfeeding No   BMI 19.64 kg/m²     Above vitals noted. We will asked the staff to retake them    No results found for this or any previous visit (from the past 24 hour(s)). Mental Status Examination     Appearance/Hygiene 63 y.o. BLACK/ female  Hygiene: Limited   Behavior/Social Relatedness  calm and this morning   Musculoskeletal Gait/Station: appropriate  Tone (flaccid, cogwheeling, spastic): not assessed  Psychomotor (hyperkinetic, hypokinetic): calmer today  Involuntary movements (tics, dyskinesias, akathisa, stereotypies): none   Speech   Rate, rhythm, volume, fluency and articulation are appropriate   Mood   labile   Affect    irritable   Thought Process Linear and goal directed   Thought Content and Perceptual Disturbances  the patient remains on insightful, rather suspicious, including staff members as being potentially harmful to her, denying any hallucinatory process, being found delusional.  Based upon yesterday's behaviors, concerns are raised regarding the patient's potential for harming self and or others. Sensorium and Cognition  Grossly intact   Insight  poor   Judgment  poor        Assessment/Plan      Psychiatric Diagnoses:   Patient Active Problem List   Diagnosis Code    [facility] F[facility]       Medical Diagnoses: Same    Psychosocial and contextual factors: Same    Level of impairment/disability: Severe    1. We will proceed to request from the courts the patient to be provided with medications against her will.   Since the patient may require to be administered with medications parenterally, the medication that we are going to suggest will have to have both oral and IM presentations. We initially considered the prescription for atypical antipsychotics, however we are going to proceed with requesting permission for haloperidol orally and or intramuscularly since increased sedation was the only side effect that she has shown to have. The patient also received lorazepam IM which could be the reason for the patient's sedation afterwards. However the combination proven to be very effective with decreasing the patient's agitation and aggression. Please see orders. 2.  Reviewed instructions, risks, benefits and side effects of medications  3. Disposition/Discharge Date: Court hearing tomorrow.   Discharge day not determined    Van Kehr, MD, [facility]

## 2021-11-11 NOTE — PROGRESS NOTES
Patient out in day area sitting to herself. Continue to be paranoid. Very guarded. Voiced no complaints.

## 2021-11-11 NOTE — BSMART NOTE
ART THERAPY GROUP PROGRESS NOTE    PATIENT SCHEDULED FOR GROUP AT: [id number]    ATTENDANCE: Full    PARTICIPATION LEVEL: Participates fully in the art process    ATTENTION LEVEL : Able to focus on task    FOCUS: Problem-solving skills    SYMBOLIC & THEMATIC CONTENT AS NOTED IN IMAGERY: She was calm, focused, and invested in the task at hand. Her approach to task was planned-out and organized. Her associations were relevant and logical. She spoke about her two dogs that both passed away within six months of each other and how she misses them.

## 2021-11-12 PROCEDURE — 74011250637 HC RX REV CODE- 250/637: Performed by: PSYCHIATRY & NEUROLOGY

## 2021-11-12 PROCEDURE — 65220000003 HC RM SEMIPRIVATE PSYCH

## 2021-11-12 PROCEDURE — 99232 SBSQ HOSP IP/OBS MODERATE 35: CPT | Performed by: PSYCHIATRY & NEUROLOGY

## 2021-11-12 RX ORDER — HALOPERIDOL 2 MG/1
2 TABLET ORAL
Status: DISCONTINUED | OUTPATIENT
Start: 2021-11-12 | End: 2021-11-16 | Stop reason: HOSPADM

## 2021-11-12 RX ORDER — HALOPERIDOL 5 MG/ML
1 INJECTION INTRAMUSCULAR 2 TIMES DAILY
Status: DISCONTINUED | OUTPATIENT
Start: 2021-11-12 | End: 2021-11-16 | Stop reason: HOSPADM

## 2021-11-12 RX ORDER — HALOPERIDOL 2 MG/1
1 TABLET ORAL 2 TIMES DAILY
Status: DISCONTINUED | OUTPATIENT
Start: 2021-11-12 | End: 2021-11-16 | Stop reason: HOSPADM

## 2021-11-12 RX ORDER — LORAZEPAM 1 MG/1
1 TABLET ORAL
Status: DISCONTINUED | OUTPATIENT
Start: 2021-11-12 | End: 2021-11-16 | Stop reason: HOSPADM

## 2021-11-12 RX ADMIN — HALOPERIDOL 1 MG: 2 TABLET ORAL at 10:27

## 2021-11-12 RX ADMIN — HALOPERIDOL 1 MG: 2 TABLET ORAL at 20:57

## 2021-11-12 NOTE — BSMART NOTE
ART THERAPY GROUP PROGRESS NOTE    Group time:10:00    The patient refused group. She was initially on the phone. She appeared preoccupied and declined to join.

## 2021-11-12 NOTE — PROGRESS NOTES
[address] [facility] [address]  Inpatient Progress Note     Date of Service: 11/12/21  Hospital Day: 10     Subjective/Interval History   11/12/21    Treatment Team Notes:  Notes reviewed and/or discussed and report that Ana Landin is a patient with a history of delusional disorder, who has been re-committed to inpatient treatment. In addition the case was presented before the courts today requesting for judicial authorization to provide the patient's medications against her will. The court has agreed with the request and has placed the order in. Patient interview: Ana Landin was interviewed by this writer today. During the court hearing today, reasons for which we remained concerned about the patient's potential for control loss, and how severe her psychotic symptoms are, were presented to the court. As a stated above, the court has agreed that the patient will require the administration of medications against her will, based upon the information provided. Every step possible to be able to discharge the patient before she lost control 2 days ago, filled out when that happened. The patient's agitation, and potential for harming others became very evident and so the reason for which not only the patient was re detained, but in addition she is being ordered to take medications against her will. As I was leaving the unit today, staff informed me that the patient is planning to appeal the court decision. However as of now there auricular statements. Otherwise, even though the patient has refused her medications as prescribed she has been in the needs to receive haloperidol in a couple of occasions.   The only effective the patient appeared to have was her becoming sedated, however is to be mentioned that during the last episode she also required the administration of lorazepam.  So it is very possible that it was a combination of haloperidol and lorazepam the culprit for how sedated she became. We will proceed to restart the low-dose of Haldol, 1 mg p.o. or IMtwice daily today. The undersigned is on call this weekend and so will be able to observe the patient closely specifically trying to determine if she develops any type of adverse effects or side effects. Staff was informed of the court decision. Objective     Visit Vitals  BP (!) 144/81   Pulse (!) 115   Temp 97.8 °F (36.6 °C)   Resp 18   Ht 5' 5\" (1.651 m)   Wt 53.5 kg (118 lb)   SpO2 100%   Breastfeeding No   BMI 19.64 kg/m²     Elevated blood pressure and increased heart rate noted this morning prior to the court hearing. They tend to improve as they patient's anxiety is less. No results found for this or any previous visit (from the past 24 hour(s)). Mental Status Examination     Appearance/Hygiene 63 y.o. BLACK/ female  Hygiene: Limited   Behavior/Social Relatedness  hypervigilant, suspicious of others, easily agitated   Musculoskeletal Gait/Station: appropriate  Tone (flaccid, cogwheeling, spastic): not assessed  Psychomotor (hyperkinetic, hypokinetic): calm   Involuntary movements (tics, dyskinesias, akathisa, stereotypies): none   Speech   Rate, rhythm, volume, fluency and articulation are appropriate   Mood   labile   Affect    irritable   Thought Process Linear and goal directed   Thought Content and Perceptual Disturbances Denies self-injurious behavior (SIB), suicidal ideation (SI), aggressive behavior or homicidal ideation (HI), however potential for control loss remains  Denies auditory and visual hallucinations, however she is obviously delusional   Sensorium and Cognition  Grossly intact   Insight  poor   Judgment  poor        Assessment/Plan      Psychiatric Diagnoses:   Patient Active Problem List   Diagnosis Code    Delusional disorder ([facility] [address] F22       Medical Diagnoses: Same    Psychosocial and contextual factors: Same    Level of impairment/disability: Severe    1.   Orders were placed in for haloperidol 1 mg by mouth twice a day. If the patient refuses her oral dose of haloperidol, it will be administered intramuscularly. 2.  Reviewed instructions, risks, benefits and side effects of medications  3.   Disposition/Discharge Date: self-care/home, TBD Melbourne Mcardle, MD, [address] [place] [address]

## 2021-11-12 NOTE — BH NOTES
Doctor gave this nurse verbal order for Haldol 1mg BID oral tablet or IM injection due to court-ordered medications.

## 2021-11-12 NOTE — BSMART NOTE
SW Contact:      Pt is a 66 year old female who presented to the emergency room with c/o \"I feel paranoid. I feel like somebody is out to get me. I'm afraid to be alone. I can't sleep. My two dogs , and I really love my dogs. My son has been arrested for murder. My son is a good person, and we don't believe that he murdered somebody. \" pt remains guarded. No meds since  ??  No outpt tx also since then. Lives with son. Dghter listed above also supportive. She's unemployed since 2018, Meg Fuel being fired from [employer].     CLINICAL IMPRESSION:  AXIS I:  Delusional disorder. AXIS II:  Noncontributory. AXIS III:  Hypertension by history.  History of left breast biopsy with negative results.  Remote history of right elbow surgery in , specifics not known . Interaction: continue to encourage pt to comply with medication regimen as well as be more involved in groups & activities. She commented \"maybe\". Update: as of 2pm pt made a couple short appearances at group sessions but didn't stay long. Support offered for effort.  & tx team updated.

## 2021-11-12 NOTE — PROGRESS NOTES
Problem: Psychosis  Goal: *STG: Remains safe in hospital  Description: Pt will remain safe in hospital daily during this admission. Outcome: Progressing Towards Goal as evidence by being free from harm during this shift. Problem: Psychosis  Goal: *STG/LTG: Complies with medication therapy  Description: Pt will be medication compliant daily. Outcome: Not Progressing Towards Goal as evidence by refusing blood pressure medications. Patient informed of court-ordered medications, and initially began stating \"I need to see my doctor again because I was to appeal it. \"  Patient educated on process for appealing and medications that will be administered due to court ordered. Patient was compliant with taking Haldol 1mg tab PO and did not required IM medications. Patient asked Haroldo Maria I see the court papers? \"  Patient was given opportunity to read over today's court paperwork. Patient began stating \"this is a lie. .. I didn't do any of these things. \"  Patient informed hospital staff \"did tell the police these things, this is what was told to them before you came to the hospital.\"  Patient admitted to not remembering \"do those things. \"  Patient \"quietly tearful\" voiced thanks for being allowed to review paperwork and has been sitting in dayroom watching TV and observing group. Patient declined to participate but was not distracting to others. Patient has been compliant with unit guidelines, free from falls and harm. Will continue to monitor and provide interventions and reassurance as appropriate.

## 2021-11-12 NOTE — BSMART NOTE
ART THERAPY GROUP PROGRESS NOTE    PATIENT SCHEDULED FOR GROUP AT: 1330    ATTENDANCE: 1/4    PARTICIPATION LEVEL: Unable to focus    ATTENTION LEVEL: Unable to attend to task at hand. FOCUS: Values    SYMBOLIC & THEMATIC CONTENT AS NOTED IN IMAGERY: She initially agreed to join group and was invested in the task at hand. A fellow patient outside of group was becoming agitated and verbally threatening towards staff, which seemed to upset the patient. She excused herself to her room and did not continue group. This writer checked in to make sure she was ok, and she smiled and claimed she was \"fine. \"

## 2021-11-12 NOTE — GROUP NOTE
[facility] GROUP DOCUMENTATION INDIVIDUAL                                                                          Group Therapy Note    Date: 11/11/2021    Group Start Time: 1945  Group End Time: 2015  Group Topic: Nursing    [facility] ADULT CHEM DEP    Chico Carmona RN    IP [facility] GROUP DOCUMENTATION GROUP    Group Therapy Note    Attendees: 8         Attendance: Did not attend          Additional Notes:  Pt. decline to join the group and take her prescribed medication.     Deisy Sevilla RN

## 2021-11-13 PROCEDURE — 74011250637 HC RX REV CODE- 250/637: Performed by: PSYCHIATRY & NEUROLOGY

## 2021-11-13 PROCEDURE — 65220000003 HC RM SEMIPRIVATE PSYCH

## 2021-11-13 PROCEDURE — 99231 SBSQ HOSP IP/OBS SF/LOW 25: CPT | Performed by: PSYCHIATRY & NEUROLOGY

## 2021-11-13 RX ADMIN — HALOPERIDOL 1 MG: 2 TABLET ORAL at 20:43

## 2021-11-13 RX ADMIN — HALOPERIDOL 1 MG: 2 TABLET ORAL at 08:19

## 2021-11-13 NOTE — GROUP NOTE
[facility]  GROUP DOCUMENTATION INDIVIDUAL                                                                          Group Therapy Note    Date: 11/12/2021    Group Start Time: 2000  Group End Time: 2030  Group Topic: Medication    [facility] [place] [id number] ADULT CHEM DEP    Alfa Orozco    IP [facility] GROUP DOCUMENTATION GROUP    Group Therapy Note    Attendees: 10         Attendance: Did not attend    Patient's Goal:  Comprehend the importance of medication compliance    Interventions/techniques: Informed    Follows Directions:  Followed directions    Interactions: Interacted appropriately    Mental Status: Calm    Behavior/appearance: Cooperative    Goals Achieved: Able to listen to others      Additional Notes:  Pt took her scheduled hs medications    Pilar Gonzalez

## 2021-11-13 NOTE — PROGRESS NOTES
Patient is isolative but there is improvement of her eating not as paranoid about the way food is packaged

## 2021-11-13 NOTE — PROGRESS NOTES
[address]  Inpatient Progress Note     Date of Service: 11/13/21  Hospital Day: 11     Subjective/Interval History   11/13/21    Treatment Team Notes:  Notes reviewed and/or discussed and report that Samia Epstein is a patient with a history of delusional disorder, committed to involuntary inpatient treatment, and being ordered by the courts to take medications against her will. However since yesterday, after the medications court hearing, the patient has begun to take her current dose of Haldol 1 mg twice a day on a regular basis. Improvement is noticeable. Patient interview: Samia Epstein was interviewed by this writer today. During the session, the patient described positive improvement on her sleeping patterns, the same as her being able to start eating even though a small amounts, on food that is no prepacked. This is obviously an improvement, with the patient denying any current medications related side effects. During session today, we discussed the possibility that if the improvement is maintained, we will be very possibly able to discharge her during the beginning of the week. Otherwise will continue to observe closely. Objective     Visit Vitals  /69   Pulse (!) 118   Temp 97.5 °F (36.4 °C)   Resp 20   Ht 5' 5\" (1.651 m)   Wt 53.5 kg (118 lb)   SpO2 100%   Breastfeeding No   BMI 19.64 kg/m²     Heart rate was elevated this morning, however her blood pressure is improving. The increased heart rate is not medications induced, since she has had it since the beginning. It appears to be anxiety related. No evidence of CHF noted during examination today. No results found for this or any previous visit (from the past 24 hour(s)). Mental Status Examination     Appearance/Hygiene 63 y.o.  BLACK/ female  Hygiene: Improving   Behavior/Social Relatedness  appears less suspicious   Musculoskeletal Gait/Station: appropriate  Tone (flaccid, cogwheeling, spastic): not assessed  Psychomotor (hyperkinetic, hypokinetic): calm   Involuntary movements (tics, dyskinesias, akathisa, stereotypies): none   Speech   Rate, rhythm, volume, fluency and articulation are appropriate   Mood   less irritable   Affect    less labile   Thought Process Linear and goal directed   Thought Content and Perceptual Disturbances Denies self-injurious behavior (SIB), suicidal ideation (SI), aggressive behavior or homicidal ideation (HI)    Denies auditory and visual hallucinations, and even though she remains delusional, the intensity of her suspiciousness is improving. Sensorium and Cognition  Grossly intact   Insight  improving slowly   Judgment  improving slowly        Assessment/Plan      Psychiatric Diagnoses:   Patient Active Problem List   Diagnosis Code    Delusional disorder ([facility] [address] F22       Medical Diagnoses: Same. The patient's blood pressure varies depending upon her anxiety level the same as care heart rate. I will be discussing with the patient the possibility of adding Toprol-XL to her treatment tomorrow, hopefully she will agree to do so. Psychosocial and contextual factors: Same    Level of impairment/disability: Severe    1. We will continue on the current dose of Haldol. Hopefully since she is able to tolerate the antipsychotic well, she will continue to take it after discharge. 2.  Reviewed instructions, risks, benefits and side effects of medications  3. Disposition/Discharge Date: self-care/home, hopefully early next week.     Akash Palma MD, [address] [place] [address]  Psychiatry

## 2021-11-13 NOTE — PROGRESS NOTES
Problem: Psychosis  Goal: *STG: Decreased delusional thinking  Description: Pt will experience a decrease in delusional daily during this admission. Outcome: Progressing Towards Goal  Goal: *STG: Remains safe in hospital  Description: Pt will remain safe in hospital daily during this admission. Outcome: Progressing Towards Goal     Problem: Falls - Risk of  Goal: *Absence of Falls  Description: Document Danne Lobe Fall Risk and appropriate interventions in the flowsheet. Pt will have no fall daily during this admission. Outcome: Progressing Towards Goal  Note: Fall Risk Interventions:            Medication Interventions: Teach patient to arise slowly          Pt presents with dull affect, anxious mood, paranoid, poor insight, guarded during interview with staff. Pt has been withdrawn to self on the unit, but did sit out in day area for several hours today watching television. Pt does present with brighter affect today. Pt is participative in groups and is adherent with unit guidelines. Pt denies SI/HI at this time. Pt refused morning medications with the exception of her court-ordered Haldol. Pt took PO Haldol 1 mg. Will continue to monitor.

## 2021-11-14 PROCEDURE — 99231 SBSQ HOSP IP/OBS SF/LOW 25: CPT | Performed by: PSYCHIATRY & NEUROLOGY

## 2021-11-14 PROCEDURE — 65220000003 HC RM SEMIPRIVATE PSYCH

## 2021-11-14 PROCEDURE — 74011250637 HC RX REV CODE- 250/637: Performed by: PSYCHIATRY & NEUROLOGY

## 2021-11-14 RX ADMIN — HALOPERIDOL 1 MG: 2 TABLET ORAL at 20:38

## 2021-11-14 RX ADMIN — HALOPERIDOL 1 MG: 2 TABLET ORAL at 08:20

## 2021-11-14 NOTE — BH NOTES
Patient took scheduled bedtime medication Haldol, but patient continues to stands at her door several times throughout the night, and has difficulty sleeping, patient offered PRN medication for sleep the patient refused will continue to follow current POC and interventions per policies/protocols.

## 2021-11-14 NOTE — BH NOTES
Pt presents with brighter affect, euthymic mood, paranoid. Pt has been more social on the unit this evening, watching television in the day area. Pt attend select groups. Pt denies SI/HI at this time. No behavioral outbursts noted. Will continue to monitor.

## 2021-11-14 NOTE — GROUP NOTE
[facility]  GROUP DOCUMENTATION INDIVIDUAL                                                                          Group Therapy Note    Date: 11/14/2021    Group Start Time: 0730  Group End Time: 0800  Group Topic: Nursing    [facility] ADULT CHEM [place]y [place]t  GROUP DOCUMENTATION GROUP    Spirituality    Group Therapy Note    Attendees: 10         Attendance: Attended    Patient's Goal:  Utilize concepts of Religious to cope with life stressors    Interventions/techniques: Informed    Follows Directions:  Followed directions    Interactions: Interacted appropriately    Mental Status: Calm    Behavior/appearance: Cooperative    Goals Achieved: Able to reflect/comment on own behavior        Ivana Garner

## 2021-11-14 NOTE — PROGRESS NOTES
[address] [facility] [address]  Inpatient Progress Note     Date of Service: 11/14/21  Hospital Day: 12     Subjective/Interval History   11/14/21    Treatment Team Notes:  Notes reviewed and/or discussed and report that Giovanny Webb is a patient with a history of delusional disorder, not only committed to involuntary inpatient treatment, also ordered to receive medications against her will. The patient has been taking her current prescription for haloperidol, and has shown positive improvement. Patient interview: Giovanny Webb was interviewed by this writer today. The patient continues to show improvement. She had some difficulties last night sleeping however it appears that was related to the presence of a staff member that the patient described that triggered her paranoia to the point in which she lost control. However she was able to sleep for 1/2 hours last night, and so for now we will maintain her current dose of haloperidol the same, since she is showing signs of improvement as indicated. Objective     Visit Vitals  BP (!) 156/92   Pulse (!) 106   Temp 97.3 °F (36.3 °C)   Resp 18   Ht 5' 5\" (1.651 m)   Wt 53.5 kg (118 lb)   SpO2 100%   Breastfeeding No   BMI 19.64 kg/m²     Blood pressure and heart rate are high, however they tend to come down as the day progresses. She continues to refuse treatment with amlodipine, and since there is improvement during the day, even though we are suggesting the patient to please take her antihypertensives as indicated, for now we will continue the same. No results found for this or any previous visit (from the past 24 hour(s)). Mental Status Examination     Appearance/Hygiene 63 y.o.  BLACK/ female  Hygiene: Improving   Behavior/Social Relatedness Appropriate   Musculoskeletal Gait/Station: appropriate  Tone (flaccid, cogwheeling, spastic): not assessed  Psychomotor (hyperkinetic, hypokinetic): calm   Involuntary movements (tics, dyskinesias, akathisa, stereotypies): none   Speech   Rate, rhythm, volume, fluency and articulation are appropriate   Mood   less irritable   Affect    less labile   Thought Process Linear and goal directed   Thought Content and Perceptual Disturbances Denies self-injurious behavior (SIB), suicidal ideation (SI), aggressive behavior or homicidal ideation (HI)    Denies auditory and visual hallucinations, with delusional thoughts showing very positive improvement   Sensorium and Cognition  Grossly intact   Insight  improving   Judgment  improving        Assessment/Plan      Psychiatric Diagnoses:   Patient Active Problem List   Diagnosis Code    Delusional disorder ([facility] [address] F22       Medical Diagnoses: The same as labile hypertension    Psychosocial and contextual factors: Same    Level of impairment/disability: Still severe    1. Treatment will continue as indicated. We are hopeful to discharge by Tuesday improvement continues. Hopefully the patient will continue to take her medications after she is discharged from the facility. 2.  Reviewed instructions, risks, benefits and side effects of medications  3. Disposition/Discharge Date: self-care/home, as a stated.     Sarah Gautam MD, [address] [place] [address]  Psychiatry

## 2021-11-14 NOTE — PROGRESS NOTES
Problem: Psychosis  Goal: *STG/LTG: Complies with medication therapy  Description: Pt will be medication compliant daily.     Patient is medication compliant    Outcome: Progressing Towards Goal     Problem: Hypertension  Goal: *Blood pressure within specified parameters    Blood pressures are getting better    Outcome: Progressing Towards Goal    Patient is calmer and more comfortable on the unit ,certain paranoia's about food and drink are improving Patient denies SI

## 2021-11-14 NOTE — GROUP NOTE
[facility]  GROUP DOCUMENTATION INDIVIDUAL                                                                          Group Therapy Note    Date: 11/13/2021    Group Start Time: 2000  Group End Time: 2030  Group Topic: Medication    [facility] ADULT CHEM DEP    Jacque Leavitt    IP [facility] GROUP DOCUMENTATION GROUP    Group Therapy Note    Attendees: 9         Attendance: Attended    Patient's Goal:  Comprehend the importance of medication compliance    Interventions/techniques: Informed    Follows Directions:  Followed directions    Interactions: Interacted appropriately    Mental Status: Calm    Behavior/appearance: Cooperative    Goals Achieved: Able to listen to others      Additional Notes:  Pt took her hs scheduled medications    Vicky Aguirre

## 2021-11-15 PROCEDURE — 65220000003 HC RM SEMIPRIVATE PSYCH

## 2021-11-15 PROCEDURE — 74011250637 HC RX REV CODE- 250/637: Performed by: PSYCHIATRY & NEUROLOGY

## 2021-11-15 PROCEDURE — 99232 SBSQ HOSP IP/OBS MODERATE 35: CPT | Performed by: PSYCHIATRY & NEUROLOGY

## 2021-11-15 RX ADMIN — HALOPERIDOL 1 MG: 2 TABLET ORAL at 08:17

## 2021-11-15 RX ADMIN — HALOPERIDOL 1 MG: 2 TABLET ORAL at 20:00

## 2021-11-15 NOTE — PROGRESS NOTES
Problem: Psychosis  Goal: *STG: Decreased delusional thinking  Description: Pt will experience a decrease in delusional daily during this admission. Outcome: Progressing Towards Goal  Goal: *STG: Remains safe in hospital  Description: Pt will remain safe in hospital daily during this admission. Outcome: Progressing Towards Goal  Goal: *STG: Participates in individual and group therapy  Description: Pt will participates in individual and group therapy daily while hospitalized. Outcome: Progressing Towards Goal  Goal: *STG/LTG: Complies with medication therapy  Description: Pt will be medication compliant daily. Outcome: Progressing Towards Goal   Patient is less paranoid, eating her food. Taking her court order medications. Patient attends most groups. She is less paranoid.

## 2021-11-15 NOTE — BSMART NOTE
SW Contact:           Pt is a 66 year old female who presented to the emergency room with c/o \"I feel paranoid. I feel like somebody is out to get me. I'm afraid to be alone. I can't sleep. My two dogs , and I really love my dogs. My son has been arrested for murder. My son is a good person, and we don't believe that he murdered somebody. \" pt remains guarded. No meds since  ??  No outpt tx also since then. Today: pt more med compliant & reports \"not as upset\".     CLINICAL IMPRESSION:  AXIS I:  Delusional disorder. AXIS II:  Noncontributory. AXIS III:  Hypertension by history.  History of left breast biopsy with negative results.  Remote history of right elbow surgery in , specifics not known . Interaction: most of session focused on purpose of outpatient tx & how it's structured. Educated pt about role / difference between med provider vs therapist as well as pt responsibility to bring up their concerns to both. Pt verbalized now understanding NOT to STOP medications without Dr recommendation. Tentative d/c plan is to stay with daughter in [place] for a couple weeks, then back to brothers in [address] [place] [address] to live. She will have therapist / med provider in [address] [place] [address]. Dr & tx team Updated.

## 2021-11-15 NOTE — BSMART NOTE
SOCIAL WORK GROUP THERAPY PROGRESS NOTE    Group Time:  10:15am      Group Topic:  Coping Skills    C D Issues    Group Participation:      Pt moderately involved during group discussion but remained attentive. Although not spontaneous she did read parts of handout & volunteer her reaction to the concept. \"Seven Steps\" for taking responsibility for our Happiness was reviewed including commitment to change, self-care, setting limits, goal setting & letting go. Admitted needing to improve \" self care\" by taking meds regularly.

## 2021-11-15 NOTE — BH NOTES
Pt. is meal and medication compliant. She remain isolative, only interact when you approached her. She voice out not complain. She denies SI or HI. Will continue to monitor for safety and provide support as needed.

## 2021-11-15 NOTE — PROGRESS NOTES
[address] [facility] [address]  Inpatient Progress Note     Date of Service: 11/15/21  Hospital Day: 13     Subjective/Interval History   11/15/21    Treatment Team Notes:  Notes reviewed and/or discussed and report that Brock Barksdale is a patient with a history of delusional disorder, becoming treatment compliant. Patient interview: Brock Barksdale was interviewed by this writer today. The patient continues to show signs of positive improvement. Her strong paranoid ideation has improved appropriately, and she is currently showing no evidence of medications related side effects. So as planned, a discharge tomorrow is expected. Objective     Visit Vitals  BP (!) 155/83   Pulse (!) 103   Temp 97.4 °F (36.3 °C)   Resp 17   Ht 5' 5\" (1.651 m)   Wt 53.5 kg (118 lb)   SpO2 100%   Breastfeeding No   BMI 19.64 kg/m²     Blood pressure remains high off and on, however the patient continues to refuse antihypertensive treatment. No results found for this or any previous visit (from the past 24 hour(s)). Mental Status Examination     Appearance/Hygiene 63 y.o.  BLACK/ female  Hygiene: Improving   Behavior/Social Relatedness  less suspicious of others   Musculoskeletal Gait/Station: appropriate  Tone (flaccid, cogwheeling, spastic): not assessed  Psychomotor (hyperkinetic, hypokinetic): calm   Involuntary movements (tics, dyskinesias, akathisa, stereotypies): none   Speech   Rate, rhythm, volume, fluency and articulation are appropriate   Mood   lability has improved   Affect    irritability has improved   Thought Process Linear and goal directed   Thought Content and Perceptual Disturbances Denies self-injurious behavior (SIB), suicidal ideation (SI), aggressive behavior or homicidal ideation (HI)    Denies auditory and visual hallucinations, delusional thoughts and paranoid thoughts are improving   Sensorium and Cognition  Grossly intact   Insight  improving   Judgment improving        Assessment/Plan      Psychiatric Diagnoses:   Patient Active Problem List   Diagnosis Code    Delusional disorder ([facility] [address] F22       Medical Diagnoses: Same    Psychosocial and contextual factors: Same    Level of impairment/disability: Severe    1. The patient is taking her current Haldol prescription without any difficulties. She is showing signs of positive improvement, and will be ready for discharge tomorrow. Hopefully she will continue to take these medications after discharge. However with her history of having medications related side effects, a decision to proceed with with the administration of a long-acting antipsychotic, will have to be deferred to the outpatient providers. 2.  Reviewed instructions, risks, benefits and side effects of medications  3. Disposition/Discharge Date: self-care/home, as above stated.     Christin Gold MD, [address] [place] [address]

## 2021-11-15 NOTE — GROUP NOTE
[facility] GROUP DOCUMENTATION INDIVIDUAL                                                                          Group Therapy Note    Date: 11/14/2021    Group Start Time: 2000  Group End Time: 2100  Group Topic: Medication    [facility] 1 ADULT CHEM DEP    Satish Ballard RN    IP [facility] GROUP DOCUMENTATION GROUP    Group Therapy Note    Attendees: 13         Attendance: Attended    Patient's Goal:  Understanding the importance of staying compliant with medication. Interventions/techniques: Reinforced    Follows Directions: Followed directions    Interactions: Interacted appropriately    Mental Status: Flat    Behavior/appearance: Cooperative    Goals Achieved: Able to receive feedback      Additional Notes:  Pt isolated to herself. Pt on court ordered medication. Took medication without disruption. Still paranoid as evidenced by still refusing to eat meals unless pt was prepackaged. Will continue to monitor and support as needed.     Andrew Caballero RN

## 2021-11-16 VITALS
WEIGHT: 118 LBS | SYSTOLIC BLOOD PRESSURE: 143 MMHG | BODY MASS INDEX: 19.66 KG/M2 | RESPIRATION RATE: 16 BRPM | DIASTOLIC BLOOD PRESSURE: 75 MMHG | HEART RATE: 100 BPM | HEIGHT: 65 IN | TEMPERATURE: 97.4 F | OXYGEN SATURATION: 100 %

## 2021-11-16 PROCEDURE — 99238 HOSP IP/OBS DSCHRG MGMT 30/<: CPT | Performed by: PSYCHIATRY & NEUROLOGY

## 2021-11-16 PROCEDURE — 74011250637 HC RX REV CODE- 250/637: Performed by: PSYCHIATRY & NEUROLOGY

## 2021-11-16 RX ORDER — HALOPERIDOL 1 MG/1
1 TABLET ORAL 2 TIMES DAILY
Qty: 30 TABLET | Refills: 1 | Status: SHIPPED | OUTPATIENT
Start: 2021-11-16

## 2021-11-16 RX ADMIN — HALOPERIDOL 1 MG: 2 TABLET ORAL at 07:52

## 2021-11-16 NOTE — GROUP NOTE
IP  GROUP DOCUMENTATION INDIVIDUAL                                                                          Group Therapy Note    Date: 11/15/2021    Group Start Time: 2000  Group End Time: 2030  Group Topic: Medication    [facility] 1 ADULT CHEM DEP    Leda Costa RN    IP [facility] GROUP DOCUMENTATION GROUP    Group Therapy Note    Attendees: 4         Attendance: Attended    Patient's Goal:  Understanding the importance of medication compliance. Interventions/techniques: Informed and Validated    Follows Directions: Followed directions    Interactions: Interacted appropriately    Mental Status: Calm    Behavior/appearance: Cooperative    Goals Achieved:  Identified feelings        Maryam Waite RN

## 2021-11-16 NOTE — BSMART NOTE
SW Contact:       Pt is a 66 year old female who presented to the emergency room with c/o \"I feel paranoid. I feel like somebody is out to get me. I'm afraid to be alone. I can't sleep.      Today: pt not as paranoid or discussing anything that indicates delusional thinking. CLINICAL IMPRESSION:  AXIS I:  Delusional disorder. AXIS II:  Noncontributory. AXIS III:  Hypertension by history.  History of left breast biopsy with negative results.  Remote history of right elbow surgery in 1999, specifics not known .      Interaction: reviewed with pt d/c and safety plan to include:    initial Intake with [facility]                               [address]                               [facility] [address] [address] [phone]   PT NEEDS TO MAKE INITIAL CONTACT TO SET UP APPOINTMENT   FOR MEDICATION AND CASE MANAGEMENT. DAUGHTER PROVIDING TRANSPORTATION. Pt agreed to above.

## 2021-11-16 NOTE — BH NOTES
[facility] RN Discharge    The discharge information has been reviewed with the patient. The patient verbalized understanding. Pts belongings were returned, left with nurse.

## 2021-11-24 NOTE — DISCHARGE SUMMARY
[facility] DISCHARGE    Name:  Michael Ortiz  MR#:   [id number]  :  1960  ACCOUNT #:  [de-identified]  ADMIT DATE:  2021  DISCHARGE DATE:  2021    SIGNIFICANT FINDINGS:  History and physical exam was performed shortly after the patient was admitted to the facility, attention being invited to the dictated admission note which is self-explanatory. There, the reason for which the patient presented herself to [facility], the findings upon her being examined there, and so the reasons for which she was offered with inpatient psychiatric care are very clearly stated. So, for the purpose of this discharge summary, the reader is advised again the patient was brought to the attention to emergency room per suggestions of her family due to the presence of increased paranoia. She described paranoid thoughts believing that people were coming after her or coming to find her. The intent of these people she had said, was to harm her. This resulted with the patient describing increased difficulty sleeping, feeling anxious, and also apparently feeling depressed. It is to be mentioned that when the undersigned met with the patient for the first time she basically only mentioned the presence of paranoia, however, did not want to discuss any potential reason for which the paranoia started after a specific trigger. She only mentioned that something had happened to her that she did not want to talk about, this resulting now people wanting to harm her. Regardless, she was medically cleared with the case being presented to the physician on-call who admitted the patient to my service.     It is to be mentioned that when the undersigned met with the patient for the first time and the possibility of medications related treatment being brought up to discussion, the patient indicated that she was of the firm belief that \"God will be able to provide for her\" and she considered that Phong Guy was not going to need any type of medications. \"  She initially left the opportunity of medications being discussed during the initial first days of treatment; however, eventually, it was of concern to all of us that the patient refused to take any medications as prescribed. Physical exam in the emergency department was that of a patient with blood pressure 178/77, pulse 134, temperature 97.8, respirations 20, 100% oxygen saturation rate on room air. She was described as being in no acute physical distress. Cardiovascular examination was remarkable for the patient's history of tachycardia, however otherwise negative. Pulmonary was clear. On neurological examination, she was described as oriented to person, place, and time. Nothing else was added to that. Psychiatric examination was that of an anxious individual who denied being suicidal or homicidal; however, was found to be rather delusional as stated. Multiple labs have been performed including a CBC with differential that showed normal test results. CMP showed normal electrolytes, blood sugar of 106. Normal kidney functioning tests. Liver function tests normal.  Cardiac enzymes negative. Alcohol level was below 3. Urine drug screen was negative. The patient had a COVID rapid testing which showed negative results from a nasopharyngeal sample. An electrocardiogram done at the emergency room showed the presence of sinus tachy with a ventricular rate response of 101 beats per minute, , and QTc 438. COURSE DURING HOSPITALIZATION AND TREATMENT:  Admitted to the adult program, the patient was seen on daily individual psychiatric basis and was referred to the groups within the context of the program.  Rather psychotic during the initial phase of the patient's hospital stay, we continued to discuss the possibility of treatment with antipsychotics with the patient continuing to refuse them.   On several occasions, the patient's paranoia was noted to be increasing with potential for control loss being elicited. This prompted the patient who had been initially admitted voluntarily, to be evaluated by the local [facility], resulting on a TDO being obtained. When the case was presented to the local courts, the decision was made to let the pt to voluntarily commit self to inpatient treatment. As treatment continued, the patient continued to be increasingly agitated, continued to be hypervigilant and suspicious and refused again any type of treatment that we suggested. On one occasion, scared, the patient abruptly came to the nursing station requesting discharge and, in the process of doing so, this happening after the 72 hours that the voluntary commitment order that the patient had agreed upon had given us to be able to treat the patient, she lost control, to the point in which she required to be administered medications against her will. This was for the purpose of helping the patient with the complete lack of control that she was exhibiting and also to assure that she was not going to harm self and/or anyone else in the program.  When this occurred, we proceeded again to request another TDO evaluation since the patient was obviously unable to be discharged. The patient was presented to the local courts at which time, this time not only they committed her but also upon request of the undersigned who was also present at the time of this court hearing, the courts agreed that the patient required medications against her will. Due to the patient showing potential for control loss and had been administered with haloperidol, this was the drug that we decided to prescribe for her since she had shown no evidence of side effects, in addition to showing positive tx response regarding her agitation.   A very low dose of haloperidol 1 mg twice a day was started with the patient being provided with support throughout the decision-making process of which antipsychotic to prescribe for her, the same as when she began to take Haldol as prescribed. The patient began to show as a result what is considered to be a fair to very positive improvement with her beginning to eat food that had been brought to her through the dietary system here in the facility, and even though at times she was noted to be still suspicious of the food that she was going to eat, she began to eat more appropriately. In addition, she took her medications as prescribed, specifically her Haldol; however, she continued to refuse treatment with antihypertensives since she had shown, in the past she said, that her blood pressure had been related to increased anxiety levels. On several occasions, we did notice this while the patient was in the facility; however, the blood pressure had remained so far elevated. Our main concern however was to treat the patient's psychotic symptoms with the patient's blood pressure elevation being considered to be limited. So, as treatment progressed, it was decided with the patient showing a fair and positive improvement to proceed to discharge with outpatient treatment referrals. The patient described having one of her cousins working for [employer] and discussing of her providing her with the name of a therapist who would provide her with outpatient treatment on a virtual basis. The patient was advised about our suggestion to be referred to [facility] for the outpatient treatment. However, the court order was for inpatient care and did not provide with the possibility of the patient being ordered to see someone specifically as an outpatient after discharge. So, with our suggestions in hand, the patient was discharged with outpatient treatment referrals. It is to be mentioned that the patient showed no evidence of medication-related side effects when Haldol was prescribed to her as indicated.   No evidence of EPS or any other type of side effects noted with the low dose of Haldol that she was prescribed; however, noticed a positive treatment response that she had to this first generation antipsychotic. While in the facility, the patient had a physical exam performed by Teddy Oliver PA-C on 11/03 that basically confirmed the findings upon the patient's examination in the emergency department. During her hospital stay, she did agree to a TSH being performed, which came back normal at 0.69 International Units/mL; however, no further testing was allowed by the patient. CONDITION UPON DISCHARGE:  Considered to be improved, no evidence of danger to self and/or others, and the patient's psychosis considered to be improving. No evidence of medication-related side effects noted upon discharge either. FINAL DIAGNOSES:  AXIS I:  Delusional disorder. AXIS II:  Noncontributory. AXIS III:  Elevated blood pressure, rule out hypertension. History of left breast biopsy with negative results. Remote history of right elbow surgery in 1999, specifics not known. DISPOSITION:  The patient was discharged to her home in [place]. She was again suggested to be followed medically by the PCP of her choice and psychiatrically by [address]; however, she indicated that she was going to be followed by the group that her cousin in [place] working for the [facility] have provided her with. Obviously, of most importance is for the patient to follow up and seeing someone that she will trust appeared to be the most appropriate way to go. PRESCRIPTIONS UPON DISCHARGE:  Haldol 1 mg tablet, #30, one refill to take one twice a day given. No evidence of medication-related side effects noted upon discharge. PROGNOSIS:  Fair to guarded depending upon the patient's treatment compliance.         Lucila Campuzano MD, LFAPA      [id number]  D:  11/24/2021 11:12  T:  11/24/2021 15:00  JOB #:  [id number]

## 2022-03-19 PROBLEM — F22 DELUSIONAL DISORDER (HCC): Status: ACTIVE | Noted: 2021-11-03

## 2023-05-16 NOTE — ED NOTES
6:00 PM Assumed care of the pt at this time. Discussed with REJI Crockett concerning patient Romeo Acevedo, standard discussion of reason for visit, HPI, ROS, PE, and current results available. Recommendation for obtaining pending crisis evaluation and recommendation to dispo the pt. Hope Domínguez PA-C     9:48 PM pt has been seen and evaluated by crisis worker Jermain Kay. She has been accepted for admission by the  Behavioral Health Unit. Will continue to monitor while in the ED awaiting bed placement. Hope Domínguez PA-C     Disposition: admitted    Dictation disclaimer:  Please note that this dictation was completed with [employer], the computer voice recognition software. Quite often unanticipated grammatical, syntax, homophones, and other interpretive errors are inadvertently transcribed by the computer software. Please disregard these errors. Please excuse any errors that have escaped final proofreading. Voicemail received on 5/16/23.  Mom returning call. voicemail left on 5/16/23 at 10:59am.

## 2024-05-01 ENCOUNTER — OFFICE VISIT (OUTPATIENT)
Facility: CLINIC | Age: 64
End: 2024-05-01
Payer: MEDICAID

## 2024-05-01 ENCOUNTER — TELEPHONE (OUTPATIENT)
Facility: CLINIC | Age: 64
End: 2024-05-01

## 2024-05-01 VITALS
OXYGEN SATURATION: 99 % | WEIGHT: 128.4 LBS | RESPIRATION RATE: 18 BRPM | BODY MASS INDEX: 21.39 KG/M2 | HEART RATE: 80 BPM | DIASTOLIC BLOOD PRESSURE: 78 MMHG | SYSTOLIC BLOOD PRESSURE: 172 MMHG | TEMPERATURE: 97.7 F | HEIGHT: 65 IN

## 2024-05-01 DIAGNOSIS — M79.10 MYALGIA: ICD-10-CM

## 2024-05-01 DIAGNOSIS — R07.89 ATYPICAL CHEST PAIN: ICD-10-CM

## 2024-05-01 DIAGNOSIS — Z12.11 SCREEN FOR COLON CANCER: ICD-10-CM

## 2024-05-01 DIAGNOSIS — Z76.89 ENCOUNTER TO ESTABLISH CARE: Primary | ICD-10-CM

## 2024-05-01 DIAGNOSIS — F22 PARANOIA (HCC): ICD-10-CM

## 2024-05-01 DIAGNOSIS — F33.1 MODERATE EPISODE OF RECURRENT MAJOR DEPRESSIVE DISORDER (HCC): ICD-10-CM

## 2024-05-01 DIAGNOSIS — I10 ESSENTIAL HYPERTENSION: ICD-10-CM

## 2024-05-01 DIAGNOSIS — Z86.19 HISTORY OF HEPATITIS C: ICD-10-CM

## 2024-05-01 DIAGNOSIS — Z12.31 ENCOUNTER FOR SCREENING MAMMOGRAM FOR MALIGNANT NEOPLASM OF BREAST: ICD-10-CM

## 2024-05-01 PROCEDURE — G8427 DOCREV CUR MEDS BY ELIG CLIN: HCPCS | Performed by: FAMILY MEDICINE

## 2024-05-01 PROCEDURE — 1036F TOBACCO NON-USER: CPT | Performed by: FAMILY MEDICINE

## 2024-05-01 PROCEDURE — 99204 OFFICE O/P NEW MOD 45 MIN: CPT | Performed by: FAMILY MEDICINE

## 2024-05-01 PROCEDURE — 3017F COLORECTAL CA SCREEN DOC REV: CPT | Performed by: FAMILY MEDICINE

## 2024-05-01 PROCEDURE — 3078F DIAST BP <80 MM HG: CPT | Performed by: FAMILY MEDICINE

## 2024-05-01 PROCEDURE — G8420 CALC BMI NORM PARAMETERS: HCPCS | Performed by: FAMILY MEDICINE

## 2024-05-01 PROCEDURE — 3077F SYST BP >= 140 MM HG: CPT | Performed by: FAMILY MEDICINE

## 2024-05-01 RX ORDER — ROSUVASTATIN CALCIUM 20 MG/1
TABLET, COATED ORAL
COMMUNITY
Start: 2024-03-05

## 2024-05-01 RX ORDER — HYDRALAZINE HYDROCHLORIDE 50 MG/1
50 TABLET, FILM COATED ORAL 3 TIMES DAILY
COMMUNITY

## 2024-05-01 RX ORDER — METOPROLOL SUCCINATE 50 MG/1
TABLET, EXTENDED RELEASE ORAL
COMMUNITY
Start: 2024-03-05

## 2024-05-01 SDOH — ECONOMIC STABILITY: FOOD INSECURITY: WITHIN THE PAST 12 MONTHS, THE FOOD YOU BOUGHT JUST DIDN'T LAST AND YOU DIDN'T HAVE MONEY TO GET MORE.: NEVER TRUE

## 2024-05-01 SDOH — HEALTH STABILITY: PHYSICAL HEALTH: ON AVERAGE, HOW MANY MINUTES DO YOU ENGAGE IN EXERCISE AT THIS LEVEL?: 20 MIN

## 2024-05-01 SDOH — ECONOMIC STABILITY: FOOD INSECURITY: WITHIN THE PAST 12 MONTHS, YOU WORRIED THAT YOUR FOOD WOULD RUN OUT BEFORE YOU GOT MONEY TO BUY MORE.: NEVER TRUE

## 2024-05-01 SDOH — ECONOMIC STABILITY: INCOME INSECURITY: HOW HARD IS IT FOR YOU TO PAY FOR THE VERY BASICS LIKE FOOD, HOUSING, MEDICAL CARE, AND HEATING?: NOT HARD AT ALL

## 2024-05-01 SDOH — ECONOMIC STABILITY: HOUSING INSECURITY
IN THE LAST 12 MONTHS, WAS THERE A TIME WHEN YOU DID NOT HAVE A STEADY PLACE TO SLEEP OR SLEPT IN A SHELTER (INCLUDING NOW)?: NO

## 2024-05-01 SDOH — HEALTH STABILITY: PHYSICAL HEALTH: ON AVERAGE, HOW MANY DAYS PER WEEK DO YOU ENGAGE IN MODERATE TO STRENUOUS EXERCISE (LIKE A BRISK WALK)?: 3 DAYS

## 2024-05-01 ASSESSMENT — ENCOUNTER SYMPTOMS
SHORTNESS OF BREATH: 0
RHINORRHEA: 0
COUGH: 0
VOMITING: 0
DIARRHEA: 0
NAUSEA: 0

## 2024-05-01 ASSESSMENT — PATIENT HEALTH QUESTIONNAIRE - PHQ9
SUM OF ALL RESPONSES TO PHQ QUESTIONS 1-9: 12
3. TROUBLE FALLING OR STAYING ASLEEP: NEARLY EVERY DAY
1. LITTLE INTEREST OR PLEASURE IN DOING THINGS: NEARLY EVERY DAY
7. TROUBLE CONCENTRATING ON THINGS, SUCH AS READING THE NEWSPAPER OR WATCHING TELEVISION: NOT AT ALL
SUM OF ALL RESPONSES TO PHQ9 QUESTIONS 1 & 2: 6
SUM OF ALL RESPONSES TO PHQ QUESTIONS 1-9: 12
4. FEELING TIRED OR HAVING LITTLE ENERGY: NOT AT ALL
9. THOUGHTS THAT YOU WOULD BE BETTER OFF DEAD, OR OF HURTING YOURSELF: NOT AT ALL
6. FEELING BAD ABOUT YOURSELF - OR THAT YOU ARE A FAILURE OR HAVE LET YOURSELF OR YOUR FAMILY DOWN: NEARLY EVERY DAY
8. MOVING OR SPEAKING SO SLOWLY THAT OTHER PEOPLE COULD HAVE NOTICED. OR THE OPPOSITE, BEING SO FIGETY OR RESTLESS THAT YOU HAVE BEEN MOVING AROUND A LOT MORE THAN USUAL: NOT AT ALL
5. POOR APPETITE OR OVEREATING: NOT AT ALL
2. FEELING DOWN, DEPRESSED OR HOPELESS: NEARLY EVERY DAY
SUM OF ALL RESPONSES TO PHQ QUESTIONS 1-9: 12
10. IF YOU CHECKED OFF ANY PROBLEMS, HOW DIFFICULT HAVE THESE PROBLEMS MADE IT FOR YOU TO DO YOUR WORK, TAKE CARE OF THINGS AT HOME, OR GET ALONG WITH OTHER PEOPLE: SOMEWHAT DIFFICULT
SUM OF ALL RESPONSES TO PHQ QUESTIONS 1-9: 12

## 2024-05-01 NOTE — PATIENT INSTRUCTIONS
FYI: You may receive a patient survey; the provider rating is based on your encounter with me specifically and NOT the staff/facility. Looking forward to your comments.          Patient Information     [facility] is dedicated to improving your health. We are excited to have you as a patient. To provide the best care, we need a good plan. To optimize your safety and care, we ask you to follow and be aware of some important policies and procedures.    A. Arrive 20 minutes prior to your appointment. Arriving prior to your scheduled medical visit allows time to complete check- in paperwork prior to seeing the physician.    B. Not showing-up for an appointment without letting your provider know leaves the practice in a difficult position and prevents other patients from being able to use the appointment slot. We are always trying to provide better access to our patients and this will help us in that goal.    C. If you are unable to keep an appointment, please call 24 hours before your appointment if at all possible. Another patient needing care might be served in the event you cannot make your appointment.    D. Bring all of your medication bottles (except those that must be refrigerated) and supplements with you to your appointment. This enables the provider to accurately assess medication needs and make important changes as needed.    E. Please seek to get your medications refilled during your scheduled appointments. This will decrease wait time for refills to be processed. When you bring all medications with you the day of your appointment, we will prescribe enough medications to last until your next appointment. Prescriptions cannot be filled after office hours, on weekends/holidays, or any other time the office is closed.    F. If you have any forms to be completed, please mention that when making your appointment. Please send the forms to the office prior to your appointment. If you cannot do that,

## 2024-05-01 NOTE — PROGRESS NOTES
Room 5    Drea Cabrera had no chief new patient listed for this encounter. for today's visit .     When asked if patient has any concerns she/he would like to address with Dr. Manuel patient states I would like hand written prescription . Dr. Manuel  has been notified  of patient concerns .    Did patient bring someone? No     Did the patient have DME equipment? No     Did you take your medication today? Yes       1. \"Have you been to the ER, urgent care clinic since your last visit?  Hospitalized since your last visit?\" Patient state yes I went to [facility] about 1 week ago  due to pain sharp shooting in the back of my head an pain my knee.     2. \"Have you seen or consulted any other health care providers outside of the [facility] since your last visit?\" No     3. For patients aged 45-75: Has the patient had a colonoscopy / FIT/ Cologuard? No order has been placed       If the patient is female:    4. For patients aged 40-74: Has the patient had a mammogram within the past 2 years? Patient states I will get a mammogram this year .       5. For patients aged 21-65: Has the patient had a pap smear? {Cancer Care Gap present? Patient states I had a pap last year and it was abnormal .           11/16/2021     7:50 AM   Amb Fall Risk Assessment and TUG Test   Total Score        Information is confidential and restricted. Go to Review Flowsheets to unlock data.              5/1/2024     1:08 PM   PHQ-9    Little interest or pleasure in doing things 3   Feeling down, depressed, or hopeless 3   Trouble falling or staying asleep, or sleeping too much 3   Feeling tired or having little energy 0   Poor appetite or overeating 0   Feeling bad about yourself - or that you are a failure or have let yourself or your family down 3   Trouble concentrating on things, such as reading the newspaper or watching television 0   Moving or speaking so slowly that other people could have 
(APRESOLINE) 50 MG tablet Take 1 tablet by mouth 3 times daily      haloperidol (HALDOL) 1 MG tablet Take 1 tablet by mouth 2 times daily       No current facility-administered medications for this visit.         No Known Allergies    Objective:  BP (!) 172/78   Pulse 80   Temp 97.7 °F (36.5 °C) (Temporal)   Resp 18   Ht 1.651 m (5' 5\")   Wt 58.2 kg (128 lb 6.4 oz)   SpO2 99%   BMI 21.37 kg/m²  Body mass index is 21.37 kg/m².    Physical assessment  Physical Exam  Vitals reviewed.   Constitutional:       General: She is not in acute distress.     Appearance: Normal appearance. She is normal weight. She is not ill-appearing, toxic-appearing or diaphoretic.   HENT:      Head: Normocephalic and atraumatic.      Right Ear: External ear normal.      Left Ear: External ear normal.   Eyes:      Extraocular Movements: Extraocular movements intact.      Conjunctiva/sclera: Conjunctivae normal.   Cardiovascular:      Rate and Rhythm: Normal rate and regular rhythm.      Heart sounds: Normal heart sounds. No murmur heard.     No friction rub. No gallop.   Pulmonary:      Effort: Pulmonary effort is normal. No respiratory distress.      Breath sounds: Normal breath sounds. No stridor. No wheezing, rhonchi or rales.   Chest:      Chest wall: No tenderness.   Musculoskeletal:         General: No tenderness.      Cervical back: Normal range of motion.      Right lower leg: No edema.      Left lower leg: No edema.      Comments: No ttp of b/l arms/legs or chest wall   Neurological:      Mental Status: She is alert and oriented to person, place, and time.      Gait: Gait normal.   Psychiatric:         Mood and Affect: Mood normal.         Behavior: Behavior normal.         Thought Content: Thought content normal.         Judgment: Judgment normal.         PHQ-9 Total Score: 12 (5/1/2024  1:08 PM)  Thoughts that you would be better off dead, or of hurting yourself in some way: 0 (5/1/2024  1:08 PM)            I have discussed

## 2024-05-09 ENCOUNTER — HOSPITAL ENCOUNTER (OUTPATIENT)
Facility: HOSPITAL | Age: 64
Setting detail: SPECIMEN
Discharge: HOME | End: 2024-05-09
Payer: MEDICAID

## 2024-05-09 DIAGNOSIS — M79.10 MYALGIA: ICD-10-CM

## 2024-05-09 DIAGNOSIS — Z86.19 HISTORY OF HEPATITIS C: ICD-10-CM

## 2024-05-09 DIAGNOSIS — I10 ESSENTIAL HYPERTENSION: ICD-10-CM

## 2024-05-09 LAB
ALBUMIN SERPL-MCNC: 3.9 G/DL (ref 3.4–5)
ALBUMIN/GLOB SERPL: 1.3 (ref 0.8–1.7)
ALP SERPL-CCNC: 69 U/L (ref 45–117)
ALT SERPL-CCNC: 17 U/L (ref 13–56)
ANION GAP SERPL CALC-SCNC: 4 MMOL/L (ref 3–18)
APPEARANCE UR: CLEAR
AST SERPL-CCNC: 14 U/L (ref 10–38)
BACTERIA URNS QL MICRO: NEGATIVE /HPF
BASOPHILS # BLD: 0 K/UL (ref 0–0.1)
BASOPHILS NFR BLD: 1 % (ref 0–2)
BILIRUB SERPL-MCNC: 0.5 MG/DL (ref 0.2–1)
BILIRUB UR QL: NEGATIVE
BUN SERPL-MCNC: 13 MG/DL (ref 7–18)
BUN/CREAT SERPL: 15 (ref 12–20)
CALCIUM SERPL-MCNC: 9.5 MG/DL (ref 8.5–10.1)
CHLORIDE SERPL-SCNC: 107 MMOL/L (ref 100–111)
CHOLEST SERPL-MCNC: 250 MG/DL
CK SERPL-CCNC: 68 U/L (ref 26–192)
CO2 SERPL-SCNC: 28 MMOL/L (ref 21–32)
COLOR UR: YELLOW
CREAT SERPL-MCNC: 0.85 MG/DL (ref 0.6–1.3)
DIFFERENTIAL METHOD BLD: ABNORMAL
EOSINOPHIL # BLD: 0 K/UL (ref 0–0.4)
EOSINOPHIL NFR BLD: 1 % (ref 0–5)
EPITH CASTS URNS QL MICRO: NORMAL /LPF (ref 0–5)
ERYTHROCYTE [DISTWIDTH] IN BLOOD BY AUTOMATED COUNT: 13.3 % (ref 11.6–14.5)
EST. AVERAGE GLUCOSE BLD GHB EST-MCNC: 111 MG/DL
GLOBULIN SER CALC-MCNC: 2.9 G/DL (ref 2–4)
GLUCOSE SERPL-MCNC: 96 MG/DL (ref 74–99)
GLUCOSE UR STRIP.AUTO-MCNC: NEGATIVE MG/DL
HBA1C MFR BLD: 5.5 % (ref 4.2–5.6)
HCT VFR BLD AUTO: 41 % (ref 35–45)
HDLC SERPL-MCNC: 76 MG/DL (ref 40–60)
HDLC SERPL: 3.3 (ref 0–5)
HGB BLD-MCNC: 13.1 G/DL (ref 12–16)
HGB UR QL STRIP: NEGATIVE
IMM GRANULOCYTES # BLD AUTO: 0 K/UL (ref 0–0.04)
IMM GRANULOCYTES NFR BLD AUTO: 0 % (ref 0–0.5)
KETONES UR QL STRIP.AUTO: NEGATIVE MG/DL
LDLC SERPL CALC-MCNC: 161 MG/DL (ref 0–100)
LEUKOCYTE ESTERASE UR QL STRIP.AUTO: NEGATIVE
LIPID PANEL: ABNORMAL
LYMPHOCYTES # BLD: 1.6 K/UL (ref 0.9–3.6)
LYMPHOCYTES NFR BLD: 39 % (ref 21–52)
MAGNESIUM SERPL-MCNC: 2.2 MG/DL (ref 1.6–2.6)
MCH RBC QN AUTO: 29.6 PG (ref 24–34)
MCHC RBC AUTO-ENTMCNC: 32 G/DL (ref 31–37)
MCV RBC AUTO: 92.6 FL (ref 78–100)
MONOCYTES # BLD: 0.4 K/UL (ref 0.05–1.2)
MONOCYTES NFR BLD: 8 % (ref 3–10)
NEUTS SEG # BLD: 2.2 K/UL (ref 1.8–8)
NEUTS SEG NFR BLD: 51 % (ref 40–73)
NITRITE UR QL STRIP.AUTO: NEGATIVE
NRBC # BLD: 0 K/UL (ref 0–0.01)
NRBC BLD-RTO: 0 PER 100 WBC
PH UR STRIP: 6 (ref 5–8)
PLATELET # BLD AUTO: 309 K/UL (ref 135–420)
PMV BLD AUTO: 10.8 FL (ref 9.2–11.8)
POTASSIUM SERPL-SCNC: 4.2 MMOL/L (ref 3.5–5.5)
PROT SERPL-MCNC: 6.8 G/DL (ref 6.4–8.2)
PROT UR STRIP-MCNC: NEGATIVE MG/DL
RBC # BLD AUTO: 4.43 M/UL (ref 4.2–5.3)
RBC #/AREA URNS HPF: NEGATIVE /HPF (ref 0–5)
SODIUM SERPL-SCNC: 139 MMOL/L (ref 136–145)
SP GR UR REFRACTOMETRY: 1.01 (ref 1–1.03)
TRIGL SERPL-MCNC: 65 MG/DL
TSH SERPL DL<=0.05 MIU/L-ACNC: 0.57 UIU/ML (ref 0.36–3.74)
UROBILINOGEN UR QL STRIP.AUTO: 0.2 EU/DL (ref 0.2–1)
VLDLC SERPL CALC-MCNC: 13 MG/DL
WBC # BLD AUTO: 4.2 K/UL (ref 4.6–13.2)
WBC URNS QL MICRO: NEGATIVE /HPF (ref 0–4)

## 2024-05-09 PROCEDURE — 86235 NUCLEAR ANTIGEN ANTIBODY: CPT

## 2024-05-09 PROCEDURE — 83036 HEMOGLOBIN GLYCOSYLATED A1C: CPT

## 2024-05-09 PROCEDURE — 80061 LIPID PANEL: CPT

## 2024-05-09 PROCEDURE — 83735 ASSAY OF MAGNESIUM: CPT

## 2024-05-09 PROCEDURE — 80053 COMPREHEN METABOLIC PANEL: CPT

## 2024-05-09 PROCEDURE — 36415 COLL VENOUS BLD VENIPUNCTURE: CPT

## 2024-05-09 PROCEDURE — 86803 HEPATITIS C AB TEST: CPT

## 2024-05-09 PROCEDURE — 82550 ASSAY OF CK (CPK): CPT

## 2024-05-09 PROCEDURE — 86038 ANTINUCLEAR ANTIBODIES: CPT

## 2024-05-09 PROCEDURE — 84443 ASSAY THYROID STIM HORMONE: CPT

## 2024-05-09 PROCEDURE — 85025 COMPLETE CBC W/AUTO DIFF WBC: CPT

## 2024-05-09 PROCEDURE — 81001 URINALYSIS AUTO W/SCOPE: CPT

## 2024-05-14 LAB
HCV AB SERPL QL IA: NORMAL
HCV IGG SERPL QL IA: NON REACTIVE S/CO RATIO

## 2024-05-15 LAB
ANA SPECKLED TITR SER: NORMAL {TITER}
ANA TITR SER IF: POSITIVE
CENTROMERE B AB SER-ACNC: <0.2 AI (ref 0–0.9)
CHROMATIN AB SERPL-ACNC: <0.2 AI (ref 0–0.9)
DSDNA AB SER-ACNC: <1 IU/ML (ref 0–9)
ENA JO1 AB SER-ACNC: <0.2 AI (ref 0–0.9)
ENA RNP AB SER-ACNC: <0.2 AI (ref 0–0.9)
ENA SCL70 AB SER-ACNC: <0.2 AI (ref 0–0.9)
ENA SM AB SER-ACNC: <0.2 AI (ref 0–0.9)
ENA SS-A AB SER-ACNC: <0.2 AI (ref 0–0.9)
ENA SS-B AB SER-ACNC: <0.2 AI (ref 0–0.9)
LABORATORY COMMENT REPORT: ABNORMAL
Lab: NORMAL
NOTE: NORMAL

## 2024-05-16 ENCOUNTER — OFFICE VISIT (OUTPATIENT)
Facility: CLINIC | Age: 64
End: 2024-05-16

## 2024-05-16 VITALS — SYSTOLIC BLOOD PRESSURE: 137 MMHG | DIASTOLIC BLOOD PRESSURE: 77 MMHG | HEART RATE: 98 BPM

## 2024-05-16 DIAGNOSIS — I10 ESSENTIAL HYPERTENSION: Primary | ICD-10-CM

## 2024-05-22 ENCOUNTER — OFFICE VISIT (OUTPATIENT)
Facility: CLINIC | Age: 64
End: 2024-05-22
Payer: MEDICAID

## 2024-05-22 VITALS
TEMPERATURE: 97.9 F | HEART RATE: 90 BPM | HEIGHT: 65 IN | OXYGEN SATURATION: 98 % | BODY MASS INDEX: 21.66 KG/M2 | SYSTOLIC BLOOD PRESSURE: 138 MMHG | WEIGHT: 130 LBS | DIASTOLIC BLOOD PRESSURE: 84 MMHG | RESPIRATION RATE: 16 BRPM

## 2024-05-22 DIAGNOSIS — R76.8 POSITIVE ANA (ANTINUCLEAR ANTIBODY): ICD-10-CM

## 2024-05-22 DIAGNOSIS — E78.2 MIXED HYPERLIPIDEMIA: Primary | ICD-10-CM

## 2024-05-22 DIAGNOSIS — F22 PARANOIA (HCC): ICD-10-CM

## 2024-05-22 DIAGNOSIS — M79.10 MYALGIA: ICD-10-CM

## 2024-05-22 DIAGNOSIS — I10 ESSENTIAL HYPERTENSION: ICD-10-CM

## 2024-05-22 DIAGNOSIS — Z12.11 SCREEN FOR COLON CANCER: ICD-10-CM

## 2024-05-22 DIAGNOSIS — R07.89 ATYPICAL CHEST PAIN: ICD-10-CM

## 2024-05-22 PROBLEM — Z86.19 HISTORY OF HEPATITIS C: Status: RESOLVED | Noted: 2024-05-01 | Resolved: 2024-05-22

## 2024-05-22 PROCEDURE — 99214 OFFICE O/P EST MOD 30 MIN: CPT | Performed by: FAMILY MEDICINE

## 2024-05-22 PROCEDURE — 3079F DIAST BP 80-89 MM HG: CPT | Performed by: FAMILY MEDICINE

## 2024-05-22 PROCEDURE — G8420 CALC BMI NORM PARAMETERS: HCPCS | Performed by: FAMILY MEDICINE

## 2024-05-22 PROCEDURE — 3017F COLORECTAL CA SCREEN DOC REV: CPT | Performed by: FAMILY MEDICINE

## 2024-05-22 PROCEDURE — 3075F SYST BP GE 130 - 139MM HG: CPT | Performed by: FAMILY MEDICINE

## 2024-05-22 PROCEDURE — G8427 DOCREV CUR MEDS BY ELIG CLIN: HCPCS | Performed by: FAMILY MEDICINE

## 2024-05-22 PROCEDURE — 1036F TOBACCO NON-USER: CPT | Performed by: FAMILY MEDICINE

## 2024-05-22 RX ORDER — ROSUVASTATIN CALCIUM 20 MG/1
TABLET, COATED ORAL
Qty: 90 TABLET | Refills: 2 | Status: SHIPPED | OUTPATIENT
Start: 2024-05-22

## 2024-05-22 ASSESSMENT — ENCOUNTER SYMPTOMS
NAUSEA: 0
SHORTNESS OF BREATH: 0
COUGH: 0
DIARRHEA: 0
RHINORRHEA: 0
VOMITING: 0

## 2024-05-22 ASSESSMENT — PATIENT HEALTH QUESTIONNAIRE - PHQ9
1. LITTLE INTEREST OR PLEASURE IN DOING THINGS: NOT AT ALL
9. THOUGHTS THAT YOU WOULD BE BETTER OFF DEAD, OR OF HURTING YOURSELF: NOT AT ALL
SUM OF ALL RESPONSES TO PHQ QUESTIONS 1-9: 2
3. TROUBLE FALLING OR STAYING ASLEEP: SEVERAL DAYS
SUM OF ALL RESPONSES TO PHQ QUESTIONS 1-9: 2
SUM OF ALL RESPONSES TO PHQ QUESTIONS 1-9: 2
SUM OF ALL RESPONSES TO PHQ9 QUESTIONS 1 & 2: 0
8. MOVING OR SPEAKING SO SLOWLY THAT OTHER PEOPLE COULD HAVE NOTICED. OR THE OPPOSITE, BEING SO FIGETY OR RESTLESS THAT YOU HAVE BEEN MOVING AROUND A LOT MORE THAN USUAL: NOT AT ALL
SUM OF ALL RESPONSES TO PHQ QUESTIONS 1-9: 2
6. FEELING BAD ABOUT YOURSELF - OR THAT YOU ARE A FAILURE OR HAVE LET YOURSELF OR YOUR FAMILY DOWN: NOT AT ALL
4. FEELING TIRED OR HAVING LITTLE ENERGY: NOT AT ALL
2. FEELING DOWN, DEPRESSED OR HOPELESS: NOT AT ALL
7. TROUBLE CONCENTRATING ON THINGS, SUCH AS READING THE NEWSPAPER OR WATCHING TELEVISION: SEVERAL DAYS
5. POOR APPETITE OR OVEREATING: NOT AT ALL
10. IF YOU CHECKED OFF ANY PROBLEMS, HOW DIFFICULT HAVE THESE PROBLEMS MADE IT FOR YOU TO DO YOUR WORK, TAKE CARE OF THINGS AT HOME, OR GET ALONG WITH OTHER PEOPLE: NOT DIFFICULT AT ALL

## 2024-05-22 NOTE — PROGRESS NOTES
Dreatarsha Cabrera presents today for   Chief Complaint   Patient presents with    Follow-up     Lab results, and med refills       Is someone accompanying this pt? No    Is the patient using any DME equipment during OV? No    Depression Screenin/22/2024    11:46 AM   PHQ-9    Little interest or pleasure in doing things 0   Feeling down, depressed, or hopeless 0   Trouble falling or staying asleep, or sleeping too much 1   Feeling tired or having little energy 0   Poor appetite or overeating 0   Feeling bad about yourself - or that you are a failure or have let yourself or your family down 0   Trouble concentrating on things, such as reading the newspaper or watching television 1   Moving or speaking so slowly that other people could have noticed. Or the opposite - being so fidgety or restless that you have been moving around a lot more than usual 0   Thoughts that you would be better off dead, or of hurting yourself in some way 0   PHQ-2 Score 0   PHQ-9 Total Score 2   If you checked off any problems, how difficult have these problems made it for you to do your work, take care of things at home, or get along with other people? 0               Health Maintenance reviewed and discussed and ordered per Provider.    Health Maintenance Due   Topic Date Due    COVID-19 Vaccine (1) Never done    HIV screen  Never done    DTaP/Tdap/Td vaccine (1 - Tdap) Never done    Cervical cancer screen  Never done    Colorectal Cancer Screen  Never done    Breast cancer screen  Never done    Shingles vaccine (1 of 2) Never done    Hepatitis B vaccine (1 of 3 - Risk 3-dose series) Never done    Respiratory Syncytial Virus (RSV) Pregnant or age 60 yrs+ (1 - 1-dose 60+ series) Never done   .        1. \"Have you been to the ER, urgent care clinic since your last visit?  Hospitalized since your last visit?\" No    2. \"Have you seen or consulted any other health care providers outside of the [facility] since your

## 2024-05-22 NOTE — PROGRESS NOTES
[address] [place] [id number]               [phone]        Drea Cabrera is a 64 y.o. female and presents with Follow-up (Lab results, and med refills)           Assessment/Plan:  Drea was seen today for follow-up.    Diagnoses and all orders for this visit:    Mixed hyperlipidemia  -     rosuvastatin (CRESTOR) 20 MG tablet; Take one tab po nightly  -     Hepatic Function Panel; Future  -     Lipid Panel; Future    Positive HUBERT (antinuclear antibody)    Essential hypertension    Myalgia    Paranoia (HCC)    Atypical chest pain    Screen for colon cancer  -     Cologuard (Fecal DNA Colorectal Cancer Screening)      HLD: uncontrolled; restart statin and repeat labs in 2m-3 months  Positive HUBERT nonspecific; no other lab abnormalities or symptoms per pt; does not meet diagnostic criteria at this time; offered referral to rheum; discussed waiting to see if other symptoms/lab changes develop prior to referral    Paranoia: continue f/u with psychiatrist and psychology as scheduled    Chest pain resolved; stress test scheduled 5/31    Rafy ordered, states she received one but had the wrong insurance info on it; advised her to call customer service; updated insurance sent with new order today        Follow up and disposition:   Return in about 8 weeks (around 7/15/2024), or if symptoms worsen or fail to improve.      Health Maintenance:   Health Maintenance   Topic Date Due    COVID-19 Vaccine (1) Never done    HIV screen  Never done    DTaP/Tdap/Td vaccine (1 - Tdap) Never done    Cervical cancer screen  Never done    Colorectal Cancer Screen  Never done    Breast cancer screen  Never done    Shingles vaccine (1 of 2) Never done    Hepatitis B vaccine (1 of 3 - Risk 3-dose series) Never done    Respiratory Syncytial Virus (RSV) Pregnant or age 60 yrs+ (1 - 1-dose 60+ series) Never done    Flu vaccine (Season Ended) 08/01/2024    Lipids  05/09/2025    Depression

## 2024-06-10 LAB — NONINV COLON CA DNA+OCC BLD SCRN STL QL: NEGATIVE

## 2024-07-22 ENCOUNTER — HOSPITAL ENCOUNTER (OUTPATIENT)
Facility: HOSPITAL | Age: 64
Setting detail: SPECIMEN
Discharge: HOME | End: 2024-07-25
Payer: MEDICAID

## 2024-07-22 DIAGNOSIS — E78.2 MIXED HYPERLIPIDEMIA: ICD-10-CM

## 2024-07-22 LAB
ALBUMIN SERPL-MCNC: 3.9 G/DL (ref 3.4–5)
ALBUMIN/GLOB SERPL: 1.2 (ref 0.8–1.7)
ALP SERPL-CCNC: 63 U/L (ref 45–117)
ALT SERPL-CCNC: 24 U/L (ref 13–56)
AST SERPL-CCNC: 20 U/L (ref 10–38)
BILIRUB DIRECT SERPL-MCNC: 0.2 MG/DL (ref 0–0.2)
BILIRUB SERPL-MCNC: 0.8 MG/DL (ref 0.2–1)
CHOLEST SERPL-MCNC: 159 MG/DL
GLOBULIN SER CALC-MCNC: 3.3 G/DL (ref 2–4)
HDLC SERPL-MCNC: 76 MG/DL (ref 40–60)
HDLC SERPL: 2.1 (ref 0–5)
LDLC SERPL CALC-MCNC: 66.8 MG/DL (ref 0–100)
LIPID PANEL: ABNORMAL
PROT SERPL-MCNC: 7.2 G/DL (ref 6.4–8.2)
TRIGL SERPL-MCNC: 81 MG/DL
VLDLC SERPL CALC-MCNC: 16.2 MG/DL

## 2024-07-22 PROCEDURE — 80076 HEPATIC FUNCTION PANEL: CPT

## 2024-07-22 PROCEDURE — 36415 COLL VENOUS BLD VENIPUNCTURE: CPT

## 2024-07-22 PROCEDURE — 80061 LIPID PANEL: CPT

## 2024-07-29 ENCOUNTER — OFFICE VISIT (OUTPATIENT)
Facility: CLINIC | Age: 64
End: 2024-07-29
Payer: MEDICAID

## 2024-07-29 VITALS
RESPIRATION RATE: 16 BRPM | DIASTOLIC BLOOD PRESSURE: 78 MMHG | OXYGEN SATURATION: 98 % | TEMPERATURE: 97 F | WEIGHT: 130 LBS | HEART RATE: 78 BPM | SYSTOLIC BLOOD PRESSURE: 138 MMHG | BODY MASS INDEX: 21.66 KG/M2 | HEIGHT: 65 IN

## 2024-07-29 DIAGNOSIS — I10 ESSENTIAL HYPERTENSION: ICD-10-CM

## 2024-07-29 DIAGNOSIS — Z12.11 SCREEN FOR COLON CANCER: ICD-10-CM

## 2024-07-29 DIAGNOSIS — R00.2 PALPITATIONS: ICD-10-CM

## 2024-07-29 DIAGNOSIS — E78.2 MIXED HYPERLIPIDEMIA: Primary | ICD-10-CM

## 2024-07-29 PROCEDURE — 99214 OFFICE O/P EST MOD 30 MIN: CPT | Performed by: FAMILY MEDICINE

## 2024-07-29 PROCEDURE — 3078F DIAST BP <80 MM HG: CPT | Performed by: FAMILY MEDICINE

## 2024-07-29 PROCEDURE — 3075F SYST BP GE 130 - 139MM HG: CPT | Performed by: FAMILY MEDICINE

## 2024-07-29 SDOH — ECONOMIC STABILITY: FOOD INSECURITY: WITHIN THE PAST 12 MONTHS, YOU WORRIED THAT YOUR FOOD WOULD RUN OUT BEFORE YOU GOT MONEY TO BUY MORE.: NEVER TRUE

## 2024-07-29 SDOH — ECONOMIC STABILITY: FOOD INSECURITY: WITHIN THE PAST 12 MONTHS, THE FOOD YOU BOUGHT JUST DIDN'T LAST AND YOU DIDN'T HAVE MONEY TO GET MORE.: NEVER TRUE

## 2024-07-29 SDOH — ECONOMIC STABILITY: INCOME INSECURITY: HOW HARD IS IT FOR YOU TO PAY FOR THE VERY BASICS LIKE FOOD, HOUSING, MEDICAL CARE, AND HEATING?: NOT HARD AT ALL

## 2024-07-29 ASSESSMENT — PATIENT HEALTH QUESTIONNAIRE - PHQ9
SUM OF ALL RESPONSES TO PHQ QUESTIONS 1-9: 0
SUM OF ALL RESPONSES TO PHQ QUESTIONS 1-9: 0
1. LITTLE INTEREST OR PLEASURE IN DOING THINGS: NOT AT ALL
SUM OF ALL RESPONSES TO PHQ QUESTIONS 1-9: 0
SUM OF ALL RESPONSES TO PHQ QUESTIONS 1-9: 0
2. FEELING DOWN, DEPRESSED OR HOPELESS: NOT AT ALL
SUM OF ALL RESPONSES TO PHQ9 QUESTIONS 1 & 2: 0

## 2024-07-29 ASSESSMENT — ENCOUNTER SYMPTOMS
VOMITING: 0
RHINORRHEA: 0
DIARRHEA: 0
COUGH: 0
SHORTNESS OF BREATH: 0
CONSTIPATION: 0
NAUSEA: 0

## 2024-07-29 NOTE — PROGRESS NOTES
Drea Cabrera is a 64 y.o. female (: 1960) presenting to address:    Chief Complaint   Patient presents with    Follow-up       Vitals:    24 1001   BP: (!) 164/61   Pulse: (!) 110   Resp: 16   Temp: 97 °F (36.1 °C)   SpO2: 98%       Coordination of Care Questionaire:   1. \"Have you been to the ER, urgent care clinic since your last visit?  Hospitalized since your last visit?\" No     2. \"Have you seen or consulted any other health care providers outside of the [facility] since your last visit?\" No      3. For patients aged 45-75: Has the patient had a colonoscopy / FIT/ Cologuard? No       If the patient is female:    4. For patients aged 40-74: Has the patient had a mammogram within the past 2 years? No       5. For patients aged 21-65: Has the patient had a pap smear? No     Advanced Directive:   1. Do you have an Advanced Directive? No     2. Would you like information on Advanced Directives? No   
2    metoprolol succinate (TOPROL XL) 50 MG extended release tablet       hydrALAZINE (APRESOLINE) 50 MG tablet Take 1 tablet by mouth 3 times daily      haloperidol (HALDOL) 1 MG tablet Take 1 tablet by mouth 2 times daily       No current facility-administered medications for this visit.         No Known Allergies    Objective:  /78   Pulse 78   Temp 97 °F (36.1 °C) (Temporal)   Resp 16   Ht 1.651 m (5' 5\")   Wt 59 kg (130 lb)   SpO2 98%   BMI 21.63 kg/m²  Body mass index is 21.63 kg/m².    Physical assessment  Physical Exam  Vitals reviewed.   Constitutional:       General: She is not in acute distress.     Appearance: Normal appearance. She is normal weight. She is not ill-appearing, toxic-appearing or diaphoretic.   HENT:      Head: Normocephalic and atraumatic.      Right Ear: External ear normal.      Left Ear: External ear normal.   Eyes:      Extraocular Movements: Extraocular movements intact.      Conjunctiva/sclera: Conjunctivae normal.   Cardiovascular:      Rate and Rhythm: Normal rate and regular rhythm.      Heart sounds: Normal heart sounds. No murmur heard.     No friction rub. No gallop.   Pulmonary:      Effort: Pulmonary effort is normal. No respiratory distress.      Breath sounds: Normal breath sounds. No stridor. No wheezing, rhonchi or rales.   Musculoskeletal:      Cervical back: Normal range of motion.      Right lower leg: No edema.      Left lower leg: No edema.   Neurological:      Mental Status: She is alert and oriented to person, place, and time.      Gait: Gait normal.   Psychiatric:         Mood and Affect: Mood normal.         Behavior: Behavior normal.         Thought Content: Thought content normal.         Judgment: Judgment normal.         PHQ-9 Total Score: 0 (7/29/2024 10:14 AM)          I have discussed the diagnosis with the patient and the intended plan as seen in the above orders.  The patient has received an After-Visit Summary and questions were answered

## 2024-07-30 RX ORDER — METOPROLOL SUCCINATE 50 MG/1
TABLET, EXTENDED RELEASE ORAL
Qty: 30 TABLET | OUTPATIENT
Start: 2024-07-30

## 2024-07-30 RX ORDER — HYDRALAZINE HYDROCHLORIDE 50 MG/1
50 TABLET, FILM COATED ORAL 3 TIMES DAILY
Qty: 90 TABLET | OUTPATIENT
Start: 2024-07-30

## 2024-10-25 ENCOUNTER — OFFICE VISIT (OUTPATIENT)
Facility: CLINIC | Age: 64
End: 2024-10-25
Payer: MEDICAID

## 2024-10-25 VITALS
WEIGHT: 127 LBS | BODY MASS INDEX: 21.16 KG/M2 | HEIGHT: 65 IN | HEART RATE: 77 BPM | SYSTOLIC BLOOD PRESSURE: 128 MMHG | DIASTOLIC BLOOD PRESSURE: 74 MMHG | RESPIRATION RATE: 16 BRPM | TEMPERATURE: 98 F | OXYGEN SATURATION: 99 %

## 2024-10-25 DIAGNOSIS — I73.9 PAD (PERIPHERAL ARTERY DISEASE) (HCC): Primary | ICD-10-CM

## 2024-10-25 DIAGNOSIS — Z11.4 SCREENING FOR HIV (HUMAN IMMUNODEFICIENCY VIRUS): ICD-10-CM

## 2024-10-25 DIAGNOSIS — E44.1 MILD PROTEIN-CALORIE MALNUTRITION (HCC): ICD-10-CM

## 2024-10-25 DIAGNOSIS — Z11.3 SCREEN FOR STD (SEXUALLY TRANSMITTED DISEASE): ICD-10-CM

## 2024-10-25 PROCEDURE — 3074F SYST BP LT 130 MM HG: CPT | Performed by: FAMILY MEDICINE

## 2024-10-25 PROCEDURE — 3078F DIAST BP <80 MM HG: CPT | Performed by: FAMILY MEDICINE

## 2024-10-25 PROCEDURE — 99214 OFFICE O/P EST MOD 30 MIN: CPT | Performed by: FAMILY MEDICINE

## 2024-10-25 ASSESSMENT — PATIENT HEALTH QUESTIONNAIRE - PHQ9
2. FEELING DOWN, DEPRESSED OR HOPELESS: NOT AT ALL
7. TROUBLE CONCENTRATING ON THINGS, SUCH AS READING THE NEWSPAPER OR WATCHING TELEVISION: NOT AT ALL
SUM OF ALL RESPONSES TO PHQ QUESTIONS 1-9: 0
3. TROUBLE FALLING OR STAYING ASLEEP: NOT AT ALL
SUM OF ALL RESPONSES TO PHQ QUESTIONS 1-9: 0
9. THOUGHTS THAT YOU WOULD BE BETTER OFF DEAD, OR OF HURTING YOURSELF: NOT AT ALL
SUM OF ALL RESPONSES TO PHQ QUESTIONS 1-9: 0
8. MOVING OR SPEAKING SO SLOWLY THAT OTHER PEOPLE COULD HAVE NOTICED. OR THE OPPOSITE, BEING SO FIGETY OR RESTLESS THAT YOU HAVE BEEN MOVING AROUND A LOT MORE THAN USUAL: NOT AT ALL
6. FEELING BAD ABOUT YOURSELF - OR THAT YOU ARE A FAILURE OR HAVE LET YOURSELF OR YOUR FAMILY DOWN: NOT AT ALL
4. FEELING TIRED OR HAVING LITTLE ENERGY: NOT AT ALL
SUM OF ALL RESPONSES TO PHQ9 QUESTIONS 1 & 2: 0
10. IF YOU CHECKED OFF ANY PROBLEMS, HOW DIFFICULT HAVE THESE PROBLEMS MADE IT FOR YOU TO DO YOUR WORK, TAKE CARE OF THINGS AT HOME, OR GET ALONG WITH OTHER PEOPLE: NOT DIFFICULT AT ALL
1. LITTLE INTEREST OR PLEASURE IN DOING THINGS: NOT AT ALL
5. POOR APPETITE OR OVEREATING: NOT AT ALL
SUM OF ALL RESPONSES TO PHQ QUESTIONS 1-9: 0

## 2024-10-25 NOTE — PROGRESS NOTES
Drea Cabrera is a 64 y.o. female (: 1960) presenting to address:    Chief Complaint   Patient presents with    Follow-up     Patient states that she has been having a white tongue for about a month now and she states that she believes she has been exposed to hep B some years ago.       Vitals:    10/25/24 1218   BP: (!) 174/78   Pulse: (!) 111   Resp: 16   Temp: 98 °F (36.7 °C)   SpO2: 99%       Coordination of Care Questionaire:     \"Have you been to the ER, urgent care clinic since your last visit?  Hospitalized since your last visit?\"    No     “Have you seen or consulted any other health care providers outside our system since your last visit?”    No     Have you had a mammogram?”   No        “Have you had a pap smear?”    No     
vaccine  Aged Out    Meningococcal (ACWY) vaccine  Aged Out    Pneumococcal 0-64 years Vaccine  Aged Out    Depression Screen  Discontinued        Subjective     63 y/o female here for f/u on Holter    Has not yet been performed with us; states she completed at Cardiology using a patch on her chest; didn't get results from that from specialist    cardiovascular specialists [facility]; seen 7/31/24; advised to f/u after echo/SHAYLA; again last week--echo normal outside of mild pulmonary hypertension, tricuspid/aortic/mitral regurg-mild; SHAYLA with mild PAD in L 0.89, borderline on R SHAYLA 0.95    States she may eat 1-2/day; not staying well hydrated; states she is afraid to eat food and concerned about providers lying about her medical conditions; I advised her of importance of eating 3x/day minimum, used to be vegan--this will improve PAD; has been eating chicken/fish;       Labs obtained prior to visit? No  Reviewed with patient? yes      ROS:     Review of Systems   Constitutional:  Positive for appetite change.         The problem list was updated as a part of today's visit.  Patient Active Problem List   Diagnosis    Paranoia (HCC)    Essential hypertension    Myalgia    Moderate episode of recurrent major depressive disorder (HCC)    Atypical chest pain    Positive HUBERT (antinuclear antibody)    Mixed hyperlipidemia       The PSH, FH were reviewed.     Past Surgical History:   Procedure Laterality Date    BREAST BIOPSY Left 1990s    for a cyst pt says it was not cancer    ORTHOPEDIC SURGERY Right 1999    R elbow       Family History   Problem Relation Age of Onset    No Known Problems Mother     Cancer Father         unknown cause/, metastatic          SH:  Social History     Tobacco Use    Smoking status: Never    Smokeless tobacco: Never   Substance Use Topics    Alcohol use: Not Currently    Drug use: Not Currently       Medications/Allergies:  Current Outpatient Medications   Medication Sig Dispense Refill

## 2024-11-06 LAB — HIV 1+2 AB+HIV1 P24 AG SERPL QL IA: NON REACTIVE

## 2024-11-20 ENCOUNTER — OFFICE VISIT (OUTPATIENT)
Facility: CLINIC | Age: 64
End: 2024-11-20
Payer: MEDICAID

## 2024-11-20 VITALS
TEMPERATURE: 97 F | DIASTOLIC BLOOD PRESSURE: 72 MMHG | HEIGHT: 65 IN | SYSTOLIC BLOOD PRESSURE: 118 MMHG | OXYGEN SATURATION: 99 % | BODY MASS INDEX: 21.33 KG/M2 | HEART RATE: 116 BPM | WEIGHT: 128 LBS | RESPIRATION RATE: 16 BRPM

## 2024-11-20 DIAGNOSIS — F22 PARANOIA (HCC): ICD-10-CM

## 2024-11-20 DIAGNOSIS — Z53.20 MAMMOGRAM DECLINED: ICD-10-CM

## 2024-11-20 DIAGNOSIS — K30 INDIGESTION: Primary | ICD-10-CM

## 2024-11-20 DIAGNOSIS — Z28.21 INFLUENZA VACCINATION DECLINED: ICD-10-CM

## 2024-11-20 PROCEDURE — 3078F DIAST BP <80 MM HG: CPT | Performed by: FAMILY MEDICINE

## 2024-11-20 PROCEDURE — 1036F TOBACCO NON-USER: CPT | Performed by: FAMILY MEDICINE

## 2024-11-20 PROCEDURE — G8427 DOCREV CUR MEDS BY ELIG CLIN: HCPCS | Performed by: FAMILY MEDICINE

## 2024-11-20 PROCEDURE — G8420 CALC BMI NORM PARAMETERS: HCPCS | Performed by: FAMILY MEDICINE

## 2024-11-20 PROCEDURE — G8484 FLU IMMUNIZE NO ADMIN: HCPCS | Performed by: FAMILY MEDICINE

## 2024-11-20 PROCEDURE — 99214 OFFICE O/P EST MOD 30 MIN: CPT | Performed by: FAMILY MEDICINE

## 2024-11-20 PROCEDURE — 3074F SYST BP LT 130 MM HG: CPT | Performed by: FAMILY MEDICINE

## 2024-11-20 PROCEDURE — 3017F COLORECTAL CA SCREEN DOC REV: CPT | Performed by: FAMILY MEDICINE

## 2024-11-20 ASSESSMENT — ENCOUNTER SYMPTOMS
ABDOMINAL PAIN: 0
COUGH: 1
VOMITING: 0

## 2024-11-20 NOTE — PROGRESS NOTES
Drea Cabrera is a 64 y.o. female (: 1960) presenting to address:    Chief Complaint   Patient presents with    Acute Care     Patient states that she has been having issues with swallowing her food. She states that it comes back up.       Vitals:    24 1159   BP: (!) 166/76   Pulse: (!) 116   Resp: 16   Temp: 97 °F (36.1 °C)   SpO2: 99%       Coordination of Care Questionaire:     \"Have you been to the ER, urgent care clinic since your last visit?  Hospitalized since your last visit?\"    No     “Have you seen or consulted any other health care providers outside our system since your last visit?”    No     Have you had a mammogram?”   No     No breast cancer screening on file      “Have you had a pap smear?”    No     No cervical cancer screening on file           
   Mixed hyperlipidemia       The PSH, FH were reviewed.     Past Surgical History:   Procedure Laterality Date    BREAST BIOPSY Left 1990s    for a cyst pt says it was not cancer    ORTHOPEDIC SURGERY Right 1999    R elbow       Family History   Problem Relation Age of Onset    No Known Problems Mother     Cancer Father         unknown cause/, metastatic          SH:  Social History     Tobacco Use    Smoking status: Never    Smokeless tobacco: Never   Substance Use Topics    Alcohol use: Not Currently    Drug use: Not Currently       Medications/Allergies:  Current Outpatient Medications   Medication Sig Dispense Refill    omeprazole (PRILOSEC) 20 MG delayed release capsule Take 1 capsule by mouth every morning (before breakfast) Wait 1 hour prior to eating 90 capsule 2    rosuvastatin (CRESTOR) 20 MG tablet Take one tab po nightly 90 tablet 2    metoprolol succinate (TOPROL XL) 50 MG extended release tablet       hydrALAZINE (APRESOLINE) 50 MG tablet Take 1.5 tablets by mouth 3 times daily      haloperidol (HALDOL) 1 MG tablet Take 1 tablet by mouth 2 times daily       No current facility-administered medications for this visit.         No Known Allergies    Objective:  /72   Pulse (!) 116   Temp 97 °F (36.1 °C) (Temporal)   Resp 16   Ht 1.651 m (5' 5\")   Wt 58.1 kg (128 lb)   SpO2 99%   BMI 21.30 kg/m²  Body mass index is 21.3 kg/m².    Physical assessment  Physical Exam  Vitals reviewed.   Constitutional:       General: She is not in acute distress.     Appearance: Normal appearance. She is normal weight. She is not ill-appearing, toxic-appearing or diaphoretic.   HENT:      Head: Normocephalic and atraumatic.      Right Ear: External ear normal.      Left Ear: External ear normal.   Eyes:      Extraocular Movements: Extraocular movements intact.      Conjunctiva/sclera: Conjunctivae normal.   Cardiovascular:      Rate and Rhythm: Normal rate and regular rhythm.      Heart sounds: Normal

## 2024-11-20 NOTE — PATIENT INSTRUCTIONS
Raise the Head of the Bed 30 degrees at night or purchase a mattress wedge. Avoid laying down for 2 hours after eating. Eat small portions. Avoid tomato based meals/red sauce, orange/grapefruit juice, chocolates or spicy foods